# Patient Record
Sex: FEMALE | Race: WHITE
[De-identification: names, ages, dates, MRNs, and addresses within clinical notes are randomized per-mention and may not be internally consistent; named-entity substitution may affect disease eponyms.]

---

## 2021-12-01 ENCOUNTER — HOSPITAL ENCOUNTER (OUTPATIENT)
Age: 61
End: 2021-12-01
Payer: COMMERCIAL

## 2021-12-01 DIAGNOSIS — I10: Primary | ICD-10-CM

## 2021-12-01 DIAGNOSIS — R94.6: ICD-10-CM

## 2021-12-01 LAB
ANION GAP: 7 (ref 5–15)
BUN SERPL-MCNC: 17 MG/DL (ref 7–18)
BUN/CREAT RATIO: 21.8 RATIO (ref 10–20)
CALCIUM SERPL-MCNC: 8.9 MG/DL (ref 8.5–10.1)
CARBON DIOXIDE: 29 MMOL/L (ref 21–32)
CHLORIDE: 107 MMOL/L (ref 98–107)
CHOLEST SERPL-MCNC: 138 MG/DL
EST GLOM FILT RATE - AFR AMER: 96 ML/MIN (ref 60–?)
GLUCOSE: 89 MG/DL (ref 74–106)
POTASSIUM: 3.9 MMOL/L (ref 3.5–5.1)
TRIGLYCERIDES: 100 MG/DL
VLDLC SERPL-MCNC: 20 MG/DL (ref 5–40)

## 2021-12-01 PROCEDURE — 80061 LIPID PANEL: CPT

## 2021-12-01 PROCEDURE — 84443 ASSAY THYROID STIM HORMONE: CPT

## 2021-12-01 PROCEDURE — 36415 COLL VENOUS BLD VENIPUNCTURE: CPT

## 2021-12-01 PROCEDURE — 80048 BASIC METABOLIC PNL TOTAL CA: CPT

## 2021-12-01 PROCEDURE — 84439 ASSAY OF FREE THYROXINE: CPT

## 2022-08-04 ENCOUNTER — HOSPITAL ENCOUNTER (OUTPATIENT)
Age: 62
Discharge: HOME | End: 2022-08-04
Payer: COMMERCIAL

## 2022-08-04 DIAGNOSIS — M25.521: Primary | ICD-10-CM

## 2022-08-04 PROCEDURE — 73080 X-RAY EXAM OF ELBOW: CPT

## 2022-08-09 ENCOUNTER — HOSPITAL ENCOUNTER (OUTPATIENT)
Dept: HOSPITAL 100 - MTRAD | Age: 62
Discharge: HOME | End: 2022-08-09
Payer: COMMERCIAL

## 2022-08-09 DIAGNOSIS — M25.521: Primary | ICD-10-CM

## 2022-08-09 PROCEDURE — 73080 X-RAY EXAM OF ELBOW: CPT

## 2022-08-22 ENCOUNTER — HOSPITAL ENCOUNTER (OUTPATIENT)
Dept: HOSPITAL 100 - MTRAD | Age: 62
Discharge: HOME | End: 2022-08-22
Payer: COMMERCIAL

## 2022-08-22 DIAGNOSIS — M25.521: Primary | ICD-10-CM

## 2022-08-22 PROCEDURE — 73080 X-RAY EXAM OF ELBOW: CPT

## 2022-09-07 ENCOUNTER — HOSPITAL ENCOUNTER (OUTPATIENT)
Age: 62
Discharge: HOME | End: 2022-09-07
Payer: COMMERCIAL

## 2022-09-07 DIAGNOSIS — S52.121A: Primary | ICD-10-CM

## 2022-09-07 PROCEDURE — 73080 X-RAY EXAM OF ELBOW: CPT

## 2022-12-05 ENCOUNTER — HOSPITAL ENCOUNTER (OUTPATIENT)
Dept: HOSPITAL 100 - MFPLAB | Age: 62
Discharge: HOME | End: 2022-12-05
Payer: COMMERCIAL

## 2022-12-05 DIAGNOSIS — I10: Primary | ICD-10-CM

## 2022-12-05 LAB
ANION GAP: 5 (ref 5–15)
BUN SERPL-MCNC: 17 MG/DL (ref 7–18)
BUN/CREAT RATIO: 19.4 RATIO (ref 10–20)
CALCIUM SERPL-MCNC: 9.1 MG/DL (ref 8.5–10.1)
CARBON DIOXIDE: 29 MMOL/L (ref 21–32)
CHLORIDE: 108 MMOL/L (ref 98–107)
EST GLOM FILT RATE - AFR AMER: 84 ML/MIN (ref 60–?)
GLUCOSE: 101 MG/DL (ref 74–106)
POTASSIUM: 4.3 MMOL/L (ref 3.5–5.1)

## 2022-12-05 PROCEDURE — 80048 BASIC METABOLIC PNL TOTAL CA: CPT

## 2022-12-05 PROCEDURE — 36415 COLL VENOUS BLD VENIPUNCTURE: CPT

## 2023-08-04 ENCOUNTER — HOSPITAL ENCOUNTER (OUTPATIENT)
Dept: HOSPITAL 100 - LABSPEC | Age: 63
Discharge: HOME | End: 2023-08-04
Payer: COMMERCIAL

## 2023-08-04 DIAGNOSIS — R30.0: Primary | ICD-10-CM

## 2023-08-04 PROCEDURE — 87088 URINE BACTERIA CULTURE: CPT

## 2023-08-04 PROCEDURE — 87086 URINE CULTURE/COLONY COUNT: CPT

## 2024-08-08 ENCOUNTER — HOSPITAL ENCOUNTER (OUTPATIENT)
Dept: HOSPITAL 100 - MFPLAB | Age: 64
Discharge: HOME | End: 2024-08-08
Payer: MEDICARE

## 2024-08-08 DIAGNOSIS — R42: ICD-10-CM

## 2024-08-08 DIAGNOSIS — I10: Primary | ICD-10-CM

## 2024-08-08 LAB
ALANINE AMINOTRANSFER ALT/SGPT: 20 U/L (ref 13–56)
ALBUMIN SERPL-MCNC: 3.9 G/DL (ref 3.2–5)
ALKALINE PHOSPHATASE: 79 U/L (ref 45–117)
ANION GAP: 6 (ref 5–15)
AST(SGOT): 22 U/L (ref 15–37)
BUN SERPL-MCNC: 17 MG/DL (ref 7–18)
BUN/CREAT RATIO: 19.1 RATIO (ref 10–20)
CALCIUM SERPL-MCNC: 8.7 MG/DL (ref 8.5–10.1)
CARBON DIOXIDE: 26 MMOL/L (ref 21–32)
CHLORIDE: 109 MMOL/L (ref 98–107)
CHOLEST SERPL-MCNC: 155 MG/DL
DEPRECATED RDW RBC: 41.3 FL (ref 35.1–43.9)
ERYTHROCYTE [DISTWIDTH] IN BLOOD: 12.5 % (ref 11.6–14.6)
EST GLOM FILT RATE - AFR AMER: 81 ML/MIN (ref 60–?)
GLOBULIN: 3.9 G/DL (ref 2.2–4.2)
GLUCOSE: 85 MG/DL (ref 74–106)
HCT VFR BLD AUTO: 39.5 % (ref 37–47)
HEMOGLOBIN: 12.7 G/DL (ref 12–15)
HGB BLD-MCNC: 12.7 G/DL (ref 12–15)
MCV RBC: 89.8 FL (ref 81–99)
MEAN CORP HGB CONC: 32.2 G/DL (ref 32–36)
MEAN PLATELET VOL.: 11.5 FL (ref 6.2–12)
PLATELET # BLD: 276 K/MM3 (ref 150–450)
PLATELET COUNT: 276 K/MM3 (ref 150–450)
POTASSIUM: 4 MMOL/L (ref 3.5–5.1)
RBC # BLD AUTO: 4.4 M/MM3 (ref 4.2–5.4)
RBC DISTRIBUTION WIDTH CV: 12.5 % (ref 11.6–14.6)
RBC DISTRIBUTION WIDTH SD: 41.3 FL (ref 35.1–43.9)
TRIGLYCERIDES: 76 MG/DL
VLDLC SERPL-MCNC: 15 MG/DL (ref 5–40)
WBC # BLD AUTO: 5 K/MM3 (ref 4.4–11)
WHITE BLOOD COUNT: 5 K/MM3 (ref 4.4–11)

## 2024-08-08 PROCEDURE — 84443 ASSAY THYROID STIM HORMONE: CPT

## 2024-08-08 PROCEDURE — 36415 COLL VENOUS BLD VENIPUNCTURE: CPT

## 2024-08-08 PROCEDURE — 85027 COMPLETE CBC AUTOMATED: CPT

## 2024-08-08 PROCEDURE — 80061 LIPID PANEL: CPT

## 2024-08-08 PROCEDURE — 80053 COMPREHEN METABOLIC PANEL: CPT

## 2024-08-08 NOTE — XMS RPT_ITS
Comprehensive CCD (C-CDA v2.1)  
  
                           Created on: 2024  
  
  
MITRA ZAVALETA  
External Reference #: CDR,PersonID:[id number]  
: 1960  
Sex: Female  
  
Demographics  
  
  
                                        Address             [address]   
[place]  [address]  
   
                                        Home Phone          [phone]  
   
                                        Home Phone          [phone]  
   
                                        Home Phone          [phone]  
   
                                        Work Phone          [phone]  
   
                                        Preferred Language  en  
   
                                        Marital Status        
   
                                        Hoahaoism Affiliation Unknown  
   
                                        Race                Unknown  
   
                                        Ethnic Group        Not  or Lati  
no  
  
  
Author  
  
  
                                        Organization        [facility]  
  
  
Care Team Providers  
  
  
                                Care Team Member Name Role            Phone  
   
                                Jaquelin, Marc Attending       Unavailable  
   
                                Pease, Gil Vesna Primary Care    Unavailab  
le  
   
                                Estela Salazar Admitting       Unavailabl  
e  
   
                                Estela Salazar Attending       Unavailabl  
e  
   
                                No Doctor Assigned, Zara Primary Care    Unavail  
able  
   
                                Stencel, Marc Attending       Unavailable  
   
                                Stencel, Marc Primary Care    Unavailable  
   
                                Stencel, Marc Admitting       Unavailable  
   
                                Stencel, Marc Attending       Unavailable  
   
                                No Doctor Assigned, Zara [facility]    Unavail  
able  
   
                                Maynor Pruitt Admitting       Unavailable  
   
                                Furlan, Maynor VU Attending       Unavailable  
   
                                Stencel, Marc Primary Care    Unavailable  
   
                                Stencel, Marc Admitting       Unavailable  
   
                                Stencel, Marc Attending       Unavailable  
   
                                Stencel, Marc Primary Care    Unavailable  
   
                                Stencel, Marc Attending       Unavailable  
   
                                Stencel, Marc Primary Care    Unavailable  
   
                                Stencel, Marc Primary Care    Unavailable  
   
                                Stencel, Marc Admitting       Unavailable  
   
                                Stencel, Marc Attending       Unavailable  
   
                                Stencel, Marc Attending       Unavailable  
   
                                Stencel, Marc Primary Care    Unavailable  
   
                                FurlanMaynor Admitting       Unavailable  
   
                                Furlan, Maynor VU Attending       Unavailable  
   
                                Stencel, Marc Primary Care    Unavailable  
   
                                Stencel, Marc Primary Care    Unavailable  
   
                                JavierApril Admitting       Unavailable  
   
                                JavierApril Attending       Unavailable  
   
                                Stencel, Marc Attending       Unavailable  
   
                                Stencel, Marc Primary Care    Unavailable  
   
                                Stencel, Marc Attending       Unavailable  
   
                                Stencel, Marc Primary Care    Unavailable  
   
                                Stencel, Marc Attending       Unavailable  
   
                                Stencel, Marc Primary Care    Unavailable  
   
                                Stencel, Marc Admitting       Unavailable  
   
                                Stencel, Marc Attending       Unavailable  
   
                                Stencel, Marc Primary Care    Unavailable  
   
                                FurlanMaynor Admitting       Unavailable  
   
                                Furlan, Maynor Amador Attending       Unavailable  
   
                                Stencel, Marc CARL Referring       Unavailable  
   
                                Stencel, Marc CARL Primary Care    Unavailable  
   
                                Stencel, Marc CARL Primary Care    Unavailable  
   
                                Stencel, Marc CARL Primary Care    Unavailable  
   
                                Dwayne Seo MD Unavailable     [phone]  
[phone]  
   
                                Stencel, Marc Unavailable     Unavailable  
   
                                Stencel, Marc CARL Unavailable     Unavailable  
   
                                Furlan, Maynor J Unavailable     Unavailable  
   
                                Jaquelin NESS, Marc Newton Primary Care Provider   
[phone]  
   
                                MARC HAMMER Primary Care    UnavailMERNA Sanders Attending       Unavailable  
   
                                MERNA LIN Referring       Unavailable  
   
                                MARC HAMMER Primary Care    Unavailab  
GIL Kovacs Attending       Unavailable  
   
                                MERNA LIN Referring       Unavailable  
   
                                MARC HAMMER Primary Care    UnavailGIL Miller Attending       Unavailable  
   
                                MARC HAMMER Primary Care    Unavailab  
MERNA Coleman Attending       Unavailable  
  
  
  
Allergies  
  
  
                                                    Allergy   
Classification                          Reported   
Allergen(s)               Allergy Type              Date of   
Onset                     Reaction(s)               Facility  
   
                                                      
(2 sources)                             Codeine;   
Translations:   
[codeine]       Drug Allergy                                    [facility]   
Repository  
   
                                                      
(1 source)                              traMADol;   
Translations:   
[TraMADol   
Hydrochloride]  Drug Allergy                                    [facility]   
Repository  
   
                                                      
(1 source)                              No Known   
Medication   
Allergies;   
Translations: [No   
Known Medication   
Allergies]                              Propensity to   
adverse   
reactions to   
drug   
(disorder)                                                  [facility]   
Repository  
   
                                                      
(1 source)                              Latex;   
Translations:   
[LATEX]                                 allergy to   
substance                               20                                                  [facility]   
Clinic  
Work Phone:   
[phone]  
   
                                                      
(1 source)                              Mold Extract;   
Translations:   
[MOLD]                    Drug Allergy              20                                                  [facility]   
Clinic  
Work Phone:   
[phone]  
   
                                                      
(1 source)          Sulfacetamide       Drug Allergy        20                                                  [facility]   
Clinic  
Work Phone:   
[phone]  
   
                                                      
(1 source)                              PLANT POLLEN;   
Translations:   
[PLANT POLLEN]                          allergy to   
substance                               20                        hay fever                 [facility]   
Clinic  
Work Phone:   
[phone]  
   
                                                      
(1 source)                Adhesive Tape             Allergy to   
substance   
(finding)                                                   [facility]  
Work Phone:   
[phone]  
   
                                                      
(1 source)   Ampicillin   Drug Allergy                           [facility]   
[facility]  
Work Phone:   
[phone]  
   
                                                      
(1 source)   traMADol     Drug Allergy                           [facility]   
[facility]  
Work Phone:   
[phone]  
   
                                                      
(5 sources)                             Amoxicillin;   
Translations:   
[AMOXICILLIN]             Drug Allergy              20  
[address] [place] [address]  
   
                                                      
(5 sources)                             Sulfacetamide;   
Translations:   
[SULFACETAMIDE]           Drug Allergy              20  
20                        Diarrhea                  [facility]  
   
                                                      
(6 sources)                             tree nut,   
unspecified;   
Translations:   
[TREE NUT]                              Propensity to   
adverse   
reactions to   
drug                                    20  
20                        Hives                     [facility]  
   
                                                      
(5 sources)                             Adhesive   
Tape-Silicones;   
Translations:   
[ADHESIVE   
TAPE-SILICONES]                         Propensity to   
adverse   
reactions to   
drug                                    20  
23                        Dermatitis                [facility]  
   
                                                      
(3 sources)                             almond allergenic   
extract;   
Translations:   
[ALMOND]                  Drug Allergy              20  
24                                      Other (See   
Comments)                               [facility]  
  
  
  
Medications  
Current Medications  
  
  
  
                      Medication Drug Class(es) Dates      Sig (Normalized) Sig   
(Original)  
   
                                                    Ca carb-Ca gluc-Mg   
ox-Mg gluco (Calcium   
Magnesium) 500 mg   
calcium -250 mg Tab  
(4 sources)                                         Start:   
2010                              take 1 tablet by   
mouth once daily                        Ca carb-Ca gluc-Mg   
ox-Mg gluco   
(Calcium Magnesium)   
500 mg calcium -250   
mg Tab Take 1   
tablet by mouth   
daily . 0   
2010 Active  
   
                                                    lactobacillus combo   
no.11 (Probiotic) 15   
billion cell CpSP  
(4 sources)                                                     lactobacillus co  
mbo   
no.11 (Probiotic)   
15 billion cell   
CpSP Take by mouth   
. 0 Active  
   
                                                    losartan potassium   
50 mg oral tablet  
(6 sources)                             Angiotensin 2   
Receptor Blocker                        Start:   
2019                              take 1 tablet by   
mouth once daily                        losartan (COZAAR)   
50 MG tablet Take 1   
(one) tablet (50 mg   
total) by mouth   
daily . 0   
2019 Active  
   
                                                    multivitamin   
(THERAGRAN) per   
tablet  
(4 sources)                                         Start:   
2010                              take 1 tablet by   
mouth once daily                        multivitamin   
(THERAGRAN) per   
tablet Take 1 (one)   
tablet by mouth   
daily . 0   
2010 Active  
   
                                                    VITAMIN B COMPLEX   
ORAL  
(4 sources)                                         Start:   
2010                              take 1 tablet by   
mouth once daily                        VITAMIN B COMPLEX   
ORAL Take 1 tablet   
by mouth daily . 0   
2010 Active  
  
  
  
Completed/Discontinued Medications  
  
  
  
                      Medication Drug Class(es) Dates      Sig (Normalized) Sig   
(Original)  
   
                                                    acetaminophen 325 mg   
oral tablet  
(1 source)                                                  take 1 tablet by   
mouth three times   
daily as needed                         Tylenol 325 MG   
Oral Tablet TAKE 1   
TABLET 3 times   
daily PRN Refills:   
0 Active  
   
                                                    aspirin 81 mg oral   
tablet  
(1 source)                              Platelet Aggregation   
Inhibitor,   
Nonsteroidal   
Anti-inflammatory   
Drug                                                        Aspirin 81 MG TABS   
TAKE 1 TABLET   
DAILY. Refills: 0   
Active  
  
  
  
Problems  
Active Problems  
  
  
                      Problem Classification Problem    Date       Documented Da  
te Episodic/Chronic  
   
                                                    Abdominal pain  
(1 source)                              Abdominal pain;   
Translations:   
[Abdominal pain,   
unspecified   
abdominal location]                                         Episodic  
   
                                                    Acute cerebrovascular   
disease  
(2 sources)                             Cerebral artery   
occlusion;   
Translations:   
[Lacunar infarction]                                         Chronic  
   
                                                    Conditions associated   
with dizziness or   
vertigo  
(1 source)                              Meniere's disease of   
right inner ear;   
Translations:   
[Meniere's disease,   
right ear]                              2024          Chronic  
   
                                                    Essential hypertension  
(1 source)                              Hypertensive   
disorder;   
Translations:   
[Hypertension]                                              Chronic  
   
                                                    Other connective   
tissue disease  
(1 source)                              Trigger thumb, left   
thumb; Translations:   
[Trigger thumb, left   
thumb]                                  Onset:   
2020                Episodic  
   
                                                    Other connective   
tissue disease  
(1 source)                              Trigger thumb, right   
thumb; Translations:   
[Trigger thumb,   
right thumb]                            Onset:   
2020                Episodic  
   
                                                    Other ear and sense   
organ disorders  
(5 sources)                             Sensorineural   
hearing loss,   
bilateral;   
Translations:   
[Sensorineural   
hearing loss,   
bilateral]                              Onset:   
2024                Chronic  
   
                                                    Other ear and sense   
organ disorders  
(1 source)                              Sensorineural   
hearing loss,   
bilateral;   
Translations:   
[Sensorineural   
hearing loss,   
bilateral]                              Onset:   
2024                                          Chronic  
   
                                                    Other ear and sense   
organ disorders  
(1 source)                              Abnormal auditory   
perception;   
Translations: [Other   
abnormal auditory   
perceptions, right   
ear]                                    2023          Episodic  
   
                                                    Other lower   
respiratory disease  
(1 source)                              Cough; Translations:   
[Cough]                                                     Episodic  
  
  
Past or Other Problems  
  
  
                      Problem Classification Problem    Date       Documented Da  
te Episodic/Chronic  
   
                                                    Conditions associated   
with dizziness or   
vertigo  
(4 sources)                             Vertigo;   
Translations:   
[Dizziness and   
giddiness]                              Onset:   
2023                Episodic  
   
                                                    Other ear and sense   
organ disorders  
(2 sources)                             Other abnormal   
auditory   
perceptions,   
right ear;   
Translations:   
[Other abnormal   
auditory   
perceptions,   
right ear]                              Onset:   
2023                                          Episodic  
   
                                                    Unclassified  
(1 source)      Problem                                           
   
                                                    Unclassified  
(2 sources)                             Patient encounter   
status;   
Translations:   
[Screening for   
colon cancer]                                                 
   
                                                    NEGATED: Highlighted   
row has not   
occurred!Residual   
codes; unclassified  
(3 sources)     Disease                                         Episodic  
  
  
  
Results  
  
  
                          Test Name    Value        Interpretation Reference   
[facility]                                   Facility  
   
                                                    DIGITAL MAMM SCREENING W/ TO  
[facility] 2021   
   
                                                    DIGITAL MAMM   
SCREENING W/ KIMBER[facility]                       MRN: [id number]  
Patient Name:   
BALDOTUNDE, MITRA  
  
STUDY:  
DIGITAL MAMM   
SCREENING W/ KIMBERLY;   
2021 9:53 am  
  
ACCESSION NUMBER(S):  
[id number]  
  
ORDERING CLINICIAN:  
APRIL FRIAS  
  
INDICATION:  
Screening.  
  
COMPARISON:  
2016 and   
2020  
  
FINDINGS:  
2D and tomosynthesis   
images were reviewed   
at 1 mm slice   
thickness.  
  
There are areas of   
scattered   
fibroglandular   
tissue. No   
suspicious  
masses or   
calcifications are   
identified. CAD was   
utilized.  
  
IMPRESSION:  
No mammographic   
evidence of   
malignancy.  
  
BI-RADS CATEGORY:  
  
Category: 1 -   
Negative.  
Recommendation: 1   
Year Screening.  
  
For any future   
breast imaging   
appointments, please   
call [phone]  
([phone]).  
  
  
  
Electronically   
signed by: JESUS COHEN MD  [facility]  
   
                                                    Office Visit (Primary Care T  
xt/Forms)on 2021   
   
                                        Follow-up visit     Diagnoses/Problems  
Assessed  
Hypertension (401.9)   
(I10)  
Adult hypothyroidism   
(244.9) (E03.9)  
Orders  
Adult hypothyroidism  
TSH - Thyroid   
Stimulating Hormone,   
Serum;   
Status:Active;   
Requested   
for:2021;  
Chief Complaint  
6 MO FU  
  
History of Present   
Illness  
layed off from   
[facility] as no   
activity  
HTN-Takes and   
tolerates meds   
without side   
effects. No alcohol.   
no tobacco. no   
exercise. low salt.   
Reviewed   
recommendation for   
150 minutes of   
exercise per week   
including 2 days of   
weight training if   
over age 50. home   
readings at goal.   
<130/<80  
would exercise at   
wellness  
compensated   
hypothyroidism rba   
rev and ros neg  
celiac test negative  
  
Review of Systems  
General-no fatigue   
weight to within 10   
pounds  
ENT no problems with   
vision swallowing  
Cardiac no chest   
pains palpitations   
change in exercise   
tolerance or   
capacity  
Pulmonary no cough   
shortness of breath  
GI no heartburn or   
abdominal pain  
Musculoskeletal ++   
joint pain in   
operated knee  
  
Active Problems  
Problems  
Abdominal pain,   
unspecified   
abdominal location   
(789.00) (R10.9)  
Cough (786.2) (R05)  
Hypertension (401.9)   
(I10)  
Lacunar infarction   
(434.91) (I63.81)  
Screening for breast   
cancer (V76.10)   
(Z12.39)  
Screening for colon   
cancer (V76.51)   
(Z12.11)  
Subcortical   
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   
(I63.9)  
Surgical History  
Problems  
History of   
Hysterectomy  
History of Knee   
Surgery  
Family History  
Mother  
Family history of   
diabetes mellitus   
(V18.0) (Z83.3)  
Family history of   
hypertension   
(V17.49) (Z82.49)  
Family history of   
renal failure   
(V18.69) (Z84.1)  
Father  
Family history of   
malignant neoplasm   
of colon (V16.0)   
(Z80.0)  
Social History  
Problems  
Denies alcohol   
consumption (V49.89)   
(Z78.9)  
Never smoked tobacco   
(V49.89) (Z78.9)  
No advance   
directives (V49.89)   
(Z78.9)  
No alcohol use  
Current Meds  
  
Medication   
NameInstruction  
Aspirin 81 MG   
TABSTAKE 1 TABLET   
DAILY.  
Losartan Potassium   
50 MG Oral   
TabletTAKE 1 TABLET   
DAILY.  
Tylenol 325 MG Oral   
TabletTAKE 1 TABLET   
3 times daily PRN  
Allergies  
Medication  
ampicillin  
codeine  
tramadol  
NonMedication  
Adhesive Tape  
Vitals  
Vital Signs  
Recorded: 2021   
08:20AM  
Temperature: 96.9 F  
Heart Rate: 61  
Systolic: 128  
Diastolic: 76  
Height: 5 ft 4 in  
Weight: 221 lb 6 oz  
BMI Calculated: 38  
BSA Calculated: 2.04  
Tobacco Use: b) No  
Fall Screening: a)   
No falls within the   
last year  
O2 Saturation: 97  
Physical Exam  
General: Alert, No   
acute distress.   
Appears stated age  
Eye: Pupils are   
equal, round and   
reactive to light,   
Extraocular   
movements are   
intact, Normal   
conjunctiva.  
Neck: Supple,   
Non-tender, No   
carotid bruit, No   
jugular venous   
distention, No   
lymphadenopathy, No   
thyromegaly.  
Respiratory: Lungs   
are clear to   
auscultation,   
Respirations are   
non-labored, Breath   
sounds are equal.  
Cardiovascular:   
Normal rate, Regular   
rhythm, No murmur.  
Gastrointestinal:   
Soft, Non-tender, No   
organomegaly. No   
solid or pulsatile   
mass  
Integumentary: Warm,   
Dry. No concerning   
lesions on exposed   
areas  
Neurologic: Alert,   
Oriented. Gross and   
fine motor intact,   
CN 2-12 intact  
Psychiatric:   
Cooperative,   
Appropriate mood AND   
affect.  
  
Results/Data  
tsh 5.1  
  
Signatures  
Electronically   
signed by : Marc Hammer MD; 2021 8:56AM EST   
(Author)  
Electronically   
signed by : Marc Hammer MD; 2021 12:36PM EST   
(Author)            Normal                                   [facility]  
   
                                                    Clinical Summary: HMSPatient  
IDon 2020   
   
                      account number [id number] [facility]   
[facility]  
Work Phone:   
[phone]  
   
                                                    Office Visit: New/Est - 1st   
visit with physician, Rm: 3on 2020   
   
                                                    NEGATED: Highlighted   
rowxray history                         of the right hand on   
2019 at   
[facility]  
Work Phone:   
[phone]  
   
                                                    Clinical Lists Update: Prelo  
ad Extendedon 2020   
   
                                                    Tobacco smoking   
status NHIS                             Tobacco smoking   
status NHIS                                                 [facility]  
Work Phone:   
[phone]  
   
                                                    Clinical Summary: Data Submi  
tted by Patient in [place] 2020   
   
                      #DEP CHLDRN No                               [facility]  
c   
[facility]  
Work Phone:   
[phone]  
   
                      ASTHEHSZHOUS 2 floors                         [facility]  
Work Phone:   
[phone]  
   
                      BROTHERS PMH High blood pressure                       [place]  
[facility]  
Work Phone:   
[phone]  
   
                      DEATHCAU DAD Colon Cancer                       [facility]  
Work Phone:   
[phone]  
   
                                        DEP ALG LIST        Sulfa Drugs,I don't   
have any food   
allergies.,Latex,Mol  
d,Plant pollens (Hay   
Fever)                                                      [facility]  
Work Phone:   
[phone]  
   
                      DEP DAD PMH Cancer                           [facility]  
Work Phone:   
[phone]  
   
                      DEP DRUG USE No                               [facility]  
Work Phone:   
[phone]  
   
                      DEP EMPLOYER employed                         [facility]  
Work Phone:   
[phone]  
   
                      DEP ETOH USE No                               [facility]  
Work Phone:   
[phone]  
   
                      DEP EXERCISE No                               [facility]  
Work Phone:   
[phone]  
   
                                        DEP MED LIST        Losartan 50 mg Tab,   
1 times per day                                             [facility]  
Work Phone:   
[phone]  
   
                                        DEP MOM PMH         Arthritis, Diabetes   
- insulin dependent,   
High blood pressure,   
Kidney disease,   
Obesity                                                     [facility]  
Work Phone:   
[phone]  
   
                      DEP SH CSMO never smoker                       [facility]  
birgit   
[facility]   
[facility]  
Work Phone:   
[phone]  
   
                      DEP SH MAST                           [facility]  
c   
[facility]   
[facility]  
Work Phone:   
[phone]  
   
                      DEP SH OCCUP                        [facility]  
[facility]   
[facility]  
Work Phone:   
[phone]  
   
                      DEP SURGERY Hysterectomy                       [facility]  
birgit   
[facility]   
[facility]  
Work Phone:   
[phone]  
   
                      DEPADDLPROB High Blood Pressure                       [place]  
[facility]   
[facility]  
Work Phone:   
[phone]  
   
                      FATHER A/D                          [facility]   
[facility]  
Work Phone:   
[phone]  
   
                      MOM HX COMM CHF                              [facility]  
c   
[facility]   
[facility]  
Work Phone:   
[phone]  
   
                      MOTHER A/D Alive                            [facility]   
[facility]  
Work Phone:   
[phone]  
   
                      RLATNSHPINFR Self                             [facility]  
ic   
[facility]   
[facility]  
Work Phone:   
[phone]  
   
                      SWHOUTYPE  house                            [facility]   
[facility]  
Work Phone:   
[phone]  
   
                                        WEBSURGCOM          Knee Surgery -   
Tissue/Bone                                                 [facility]   
[facility]  
Work Phone:   
[phone]  
   
                                                    ERIKOVon 2019   
   
                                        CNOV                Office Visit   
(WILL)  
--------------------  
MITRA ZAVALETA   
([id number])   
1960 F  
Date Time Provider   
Department  
19 1:00 PM   
EBEN BLANCO  
During your visit   
today, we recorded   
the following   
information about   
you:  
Eben Blanco MD   
2019 1:22 PM   
Signed  
New patient referred   
by Herelf for   
bilateral hand   
concerns. We will   
correspond  
with via a shared   
medical record and a   
copy of this office   
note.  
HPI: Ms.. Zavaleta is   
a R hand dominant 59   
year old year old   
female who is  
active, with a chief   
complaint of   
bilateral thumb   
trigger finger. The   
symptoms  
have been going on   
for 2 months on the   
right, 1 year on the   
left. No antecedent  
trauma, the patient   
is not diabetic.   
Other complaints   
include pain when   
this  
triggers. Evaluation   
has included x-rays   
of the hand.   
Treatment to date   
has  
included splints.   
The current pain is   
rated a 4/10 and   
interferes with  
activities of daily   
living. She states   
her brother has the   
same thing and gets  
injections for them.  
PAST MEDICAL HISTORY  
Diagnosis Date  
- Overweight  
Current Outpatient   
Medications  
Medication Sig   
Dispense Refill  
- losartan (COZAAR)   
50 mg tablet  
- aspirin, enteric   
coated (ASPIRIN,   
ENTERIC COATED) 81   
mg EC tablet Take 81   
mg  
by mouth once daily.  
- estradiol(ESTROGEL   
1.25 GRAM/ACTUATION   
(0.06%) TRANSDERMAL   
GEL PUMP) 0  
- hydrocodone   
bit/acetaminophen(VI  
CODIN 5 MG-500 MG   
TAB) Take 1-2   
tablet's)  
every six(6) hours   
as needed for pain.   
0  
- vitamin b   
complex(B COMPLEX   
CAP) Take one(1)   
tablet daily. 0  
- MULTIVITAMIN TAB   
Take one(1) tablet   
daily. 0  
- calcium carb/vit   
d3/minerals(CALTRATE   
PLUS 600 MG-400 UNIT   
TAB) Take one(1)  
tablet daily. 0  
- CALCIUM-MAGNESIUM   
TAB Take one(1)   
tablet daily. 0  
No current   
facility-administere  
d medications for   
this visit.  
ALLERGIES  
Allergen Reactions  
- Adhesive  
Blistering from   
tape, suspects latex   
allergy, not   
confirmed.  
- Hayfever [Other]  
- Versed [Midazolam   
H*  
Migraine, GI upset,   
nausea, vomiting  
All medical history,   
medications and   
allergies have been   
discussed with the  
patient today.  
ROS:  
REVIEW OF SYMPTOMS:  
Constitutional:   
patient denies any   
recent fever or   
significant change   
in weight  
Gastrointestinal:   
patient denies any   
current abdominal   
discomfort  
Musculoskeletal: as   
noted in the HPI  
Neurologic: as noted   
in the HPI  
SOCIAL HISTORY:  
Tobacco Use: Never  
Physical   
Examination: Ms.. Zavaleta is a healthy   
appearing female in   
no acute  
distress. Bilateral   
upper limbs have   
equal and intact   
peripheral pulses.   
Skin  
does not demonstrate   
any rashes or   
lesions. Negative   
Tinel's over the  
bilateral carpal   
tunnel. Sensation is   
intact throughout.   
Positive tenderness  
to palpation over   
the thumb A1 pulleys   
and some clicking of   
the involved  
digits, no locking,   
at this time but   
cannot flex the R   
thumb IP joint. The  
extensor tendons all   
track centrally.  
X-rays dated   
2019:   
No fracture or   
dislocation  
Assessment: Trigger   
finger of both hands   
(primary encounter   
diagnosis)  
Pain of right hand  
Plan: I discussed   
with Ms.. Zavaleta   
the diagnosis and   
different treatment  
options. We   
discussed   
observation,   
splinting, cortisone   
injections and   
surgical  
release. The patient   
would like to try   
cortisone   
injections. RTC 8   
weeks prn.  
The risk, benefits   
and alternatives of   
injection and no   
injection therapy   
were  
discussed. The   
patient consented   
for an injection.   
The patient has been  
identified by name   
and birthdate. The   
injection site was   
identified, marked   
and  
prepped with a   
alcohol swab. Time   
out completed at   
12:53 PM. The   
bilateral  
thumbs was/were   
injected with a 25   
gauge needle with   
1/2cc Celestone and   
1/2 cc  
xylocaine plain 1%.   
The injection site   
was then dressed   
with a bandaid. The  
patient tolerated   
the injection well.   
The patient was   
instructed to   
monitor  
their blood sugars   
if diabetic and call   
if any concerns. The   
patient was  
instructed to call   
the office if any   
adverse local   
effects occurred or   
any if  
any questions or   
concerns arise.  
Vero Porter MD  
2019   
12:49 PM  
Attending Note:  
I have seen and   
examined Ms.. Zavaleta and   
discussed the   
patient management   
with  
the resident/fellow.   
The   
resident's/fellow's   
progress note has   
been reviewed,  
edited, and signed.   
I was present for   
any and all   
procedures performed   
at this  
visit.  
Eben Blanco MD  
2019   
1:20 PM  
Referring Provider:   
SELF [[id number]]  
Allergies As of   
Date: 2019   
Noted Allergy   
Reaction  
ADHESIVE 2010  
Comments: Blistering   
from tape, suspects   
latex allergy, not   
confirmed.  
hayfever [Other]   
2010  
VERSED (MIDAZOLAM   
HCL) 2010  
Comments: Migraine,   
GI upset, nausea,   
vomiting  
Date Reviewed:   
2019  
Reviewed by: Eben Blanco - Fully   
Assessed  
Reason for Visit:  
New Patient [172]   
Cmt: right thumb  
Primary Visit   
Diagnosis:Trigger   
finger of both hands   
[M65.30]  
Other Visit   
Diagnosis:Pain of   
right hand [M79.641]  
Order(s):XR HAND   
GENERAL 3V   
PA/LAT/OBL RT   
[[id number]] Order #:   
[id number] FUTURE  
betamethasone   
acetate-betamethason  
e sodium phosphate 3   
mg, lidocaine  
(PF) 10 mg/mL (1 %)   
5 mgDisp: Rfl:  
betamethasone   
acetate-betamethason  
e sodium phosphate 3   
mg, lidocaine  
(PF) 10 mg/mL (1 %)   
5 mgDisp: Rfl:  
Prescriptions as of   
2019 Sig:  
LOSARTAN 50 MG   
TABLET  
ASPIRIN 81 MG   
TABLET,DELAYED *   
Take 81 mg by mouth   
once jocelynn*  
ESTROGEL 1.25   
GRAM/ACTUATION *  
VICODIN 5 MG-500 MG   
TABLET Take 1-2   
tablet's) every   
six(*  
B COMPLEX CAPSULE   
Take one(1) tablet   
daily.  
MULTIVITAMIN TABLET   
Take one(1) tablet   
daily.  
CALTRATE PLUS 600 MG   
CALCIUM-* Take   
one(1) tablet daily.  
CALCIUM-MAGNESIUM   
TABLET Take one(1)   
tablet daily.  
Problem List As Of   
Date 2019   
Noted Resolved  
Osteoarth NOS-L/Leg   
[M17.10] INVALID   
FOR*  
Prescriptions   
ordered this   
encounter Disp   
Refills Start End  
CAM IVETTE INJECTION   
BUILDER 2019  
Class: Suppress   
Questions  
Route: [facility]  
CAM IVETTE INJECTION   
BUILDER 2019  
Class: Suppress   
Questions  
Route: [facility]  
Encounter Number:   
[id number]  
Encounter   
Status:Closed by   
EBEN BLANCO MD   
on 19          [facility]  
   
                                                    PROGRESSon 2019   
   
                                        PROGRESS            HNO ID: [id number]  
Author: Eben Blanco  
Service: ?  
Author Type:   
Physician  
Type: Progress Notes  
Filed: 2019   
1:22 PM  
Note Text:  
New patient referred   
by Herelf for   
bilateral hand   
concerns. We will  
correspond with via   
a shared medical   
record and a copy of   
this office  
note.  
HPI: Ms.. Zavaleta is   
a R hand dominant 59   
year old year old   
female who is  
active, with a chief   
complaint of   
bilateral thumb   
trigger finger. The  
symptoms have been   
going on for 2   
months on the right,   
1 year on the left.  
No antecedent   
trauma, the patient   
is not diabetic.   
Other complaints  
include pain when   
this triggers.   
Evaluation has   
included x-rays of   
the  
hand. Treatment to   
date has included   
splints. The current   
pain is rated a  
4/10 and interferes   
with activities of   
daily living. She   
states her  
brother has the same   
thing and gets   
injections for them.  
PAST MEDICAL HISTORY  
Diagnosis Date  
- Overweight  
Current Outpatient   
Medications  
Medication Sig   
Dispense Refill  
- losartan (COZAAR)   
50 mg tablet  
- aspirin, enteric   
coated (ASPIRIN,   
ENTERIC COATED) 81   
mg EC tablet Take  
81 mg by mouth once   
daily.  
- estradiol(ESTROGEL   
1.25 GRAM/ACTUATION   
(0.06%) TRANSDERMAL   
GEL PUMP) 0  
- hydrocodone   
bit/acetaminophen(VI  
CODIN 5 MG-500 MG   
TAB) Take 1-2  
tablet's) every   
six(6) hours as   
needed for pain. 0  
- vitamin b   
complex(B COMPLEX   
CAP) Take one(1)   
tablet daily. 0  
- MULTIVITAMIN TAB   
Take one(1) tablet   
daily. 0  
- calcium carb/vit   
d3/minerals(CALTRATE   
PLUS 600 MG-400 UNIT   
TAB) Take  
one(1) tablet daily.   
0  
- CALCIUM-MAGNESIUM   
TAB Take one(1)   
tablet daily. 0  
No current   
facility-administere  
d medications for   
this visit.  
ALLERGIES  
Allergen Reactions  
- Adhesive  
Blistering from   
tape, suspects latex   
allergy, not   
confirmed.  
- Hayfever [Other]  
- Versed [Midazolam   
H*  
Migraine, GI upset,   
nausea, vomiting  
All medical history,   
medications and   
allergies have been   
discussed with  
the patient today.  
ROS:  
REVIEW OF SYMPTOMS:  
Constitutional:   
patient denies any   
recent fever or   
significant change   
in  
weight  
Gastrointestinal:   
patient denies any   
current abdominal   
discomfort  
Musculoskeletal: as   
noted in the HPI  
Neurologic: as noted   
in the HPI  
SOCIAL HISTORY:  
Tobacco Use: Never  
Physical   
Examination: Ms.. Zavaleta is a healthy   
appearing female in   
no  
acute distress.   
Bilateral upper   
limbs have equal and   
intact peripheral  
pulses. Skin does   
not demonstrate any   
rashes or lesions.   
Negative  
Tinel's over the   
bilateral carpal   
tunnel. Sensation is   
intact throughout.  
Positive tenderness   
to palpation over   
the thumb A1 pulleys   
and some  
clicking of the   
involved digits, no   
locking, at this   
time but cannot flex  
the R thumb IP   
joint. The extensor   
tendons all track   
centrally.  
X-rays dated   
2019:   
No fracture or   
dislocation  
Assessment: Trigger   
finger of both hands   
(primary encounter   
diagnosis)  
Pain of right hand  
Plan: I discussed   
with Ms.. Zavaleta   
the diagnosis and   
different treatment  
options. We   
discussed   
observation,   
splinting, cortisone   
injections and  
surgical release.   
The patient would   
like to try   
cortisone   
injections. RTC  
8 weeks prn.  
The risk, benefits   
and alternatives of   
injection and no   
injection therapy  
were discussed. The   
patient consented   
for an injection.   
The patient has  
been identified by   
name and birthdate.   
The injection site   
was identified,  
marked and prepped   
with a alcohol swab.   
Time out completed   
at 12:53 PM.  
The bilateral thumbs   
was/were injected   
with a 25 gauge   
needle with 1/2cc  
Celestone and 1/2 cc   
xylocaine plain 1%.   
The injection site   
was then  
dressed with a   
bandaid. The patient   
tolerated the   
injection well. The  
patient was   
instructed to   
monitor their blood   
sugars if diabetic   
and call  
if any concerns. The   
patient was   
instructed to call   
the office if any  
adverse local   
effects occurred or   
any if any questions   
or concerns arise.  
Vero Porter MD  
2019   
12:49 PM  
Attending Note:  
I have seen and   
examined Ms.. Zavaleta and   
discussed the   
patient management  
with the   
resident/fellow. The   
resident's/fellow's   
progress note has   
been  
reviewed, edited,   
and signed. I was   
present for any and   
all procedures  
performed at this   
visit.  
Eben Blanco MD  
2019   
1:20 PM             Normal                                  [facility]  
   
                                        PROGRESS            HNO ID: [id number]  
Author: Denis Vines  
Service: ?  
Author Type:   
Technician  
Type: Progress Notes  
Filed: 2019   
12:29 PM  
Note Text:  
Radiology Service   
Progress Note  
PATIENT NAME: Mitra Zavaleta  
MRN: [id number]  
DATE OF SERVICE:   
2019  
TIME: 12:28 PM  
PATIENT IDENTITY   
VERIFICATION   
COMPLETED USING TWO   
(2) METHODS: Name   
and  
Date of Birth   
confirmed by patient   
verbally.  
PATIENT GENDER DATA:   
Female. Pregnancy   
status: Pregnant: No  
Breastfeeding   
status: NO.  
PATIENT RELEVANT   
IMPLANT DATA   
REVIEWED: Not   
Applicable  
RADIOLOGY   
DEPARTMENT: General   
X-ray: Exam(s)   
Completed: Upper   
Extremity  
X-Ray(s): Hand,   
right :  
PERIPHERAL IV DATA:   
Not applicable  
SIGNED BY: Denis Vines  
2019   
12:28 PM            [facility]  
   
                                                    XR HAND 3V PA/LAT/OBL RTon 0  
2019   
   
                                                    XR HAND 3V   
PA/LAT/OBL RT                           * * *Final Report* *   
*  
DATE OF EXAM: Sep 30   
2019 12:30PM  
[id number] - XR HAND   
3V PA/LAT/OBL RT /   
ACCESSION #   
[id number]  
PROCEDURE REASON:   
Pain of right hand  
  
* * * * Physician   
Interpretation * * *   
*  
HISTORY: Pain of   
right hand . Rt   
thumb pain  
TECHNIQUE: XR HAND   
3V PA/LAT/OBL RT  
Laterality: RIGHT  
Number of different   
views (projections):   
3  
COMPARISON: None  
RESULT:  
No fracture or   
dislocation. Thumb   
CMC joint is   
maintained.   
Multilevel  
interphalangeal   
joint space   
narrowing with   
sclerosis.   
Osteophytes, most  
significant at   
second digit DIP.  
--------------------  
--------------------  
-----  
IMPRESSION:  
Degenerative   
changes, without   
acute process.  
:   
JACOB  
Transcribe   
Date/Time: Sep 30   
2019 3:19P  
Dictated by :   
TALISHA FERRARI MD  
This examination was   
interpreted and the   
report reviewed and  
electronically   
signed by:  
BRYANNA CERRATO MD on   
Sep 30 2019 8:48PM   
EST  
[id number]  
CN                  Normal                                  [facility]  
   
                                                    XR Wrist 3+ Views Lefton    
   
                                                    XR Wrist 3+ Views   
Left                                    Exam Date/Time:  
2019 08:10 EST  
Reason for Exam:  
Pain, Traumatic  
Report  
STUDY:  
XR Wrist 3+ Views   
Left; 2019 8:10   
am  
INDICATION:  
Pain, Traumatic.  
COMPARISON:  
None.  
ACCESSION NUMBER(S):  
[id number]  
ORDERING CLINICIAN:  
Marc Hammer  
TECHNIQUE:  
Four views of the   
left wrist including   
AP, oblique,   
navicular cone down   
and lateral   
projections  
were obtained.  
FINDINGS:  
There is no evidence   
of acute fracture or   
dislocation   
identified. The   
joint spaces are   
well  
preserved without   
significant   
degenerative   
changes.  
IMPRESSION:  
1. No evidence of   
acute fracture or   
dislocation.  
***** FINAL REPORT   
*****  
Dictated: 2019   
10:10 am Kennedy Perez MD  
Signed (Electronic   
Signature):   
2019 10:10 am  
Signed by: Kennedy Perez MD  
Technologist: [facility]  
  
  
  
Vital Signs  
  
  
                          Date Time    Vital Sign   Value        Performing   
Clinician                               Facility  
   
                                                    2024   
10:          Body height         160 cm              Merna Lin MD  
Work Phone:   
[phone]                          [facility]  
   
                                                    2024   
10:                              Body mass index   
(BMI) [Ratio]             37.7 kg/m2                Merna Lin MD  
Work Phone:   
[phone]                          [facility]  
   
                                                    2024   
10:          Body temperature    97.9 [degF]         Merna Lin MD  
Work Phone:   
[phone]                          [facility]  
   
                                                    2024   
10:          Body weight         96.53 kg            Merna Lin MD  
Work Phone:   
[phone]                          [facility]  
   
                                                    2023   
10:          Body height         160 cm              Merna Lin MD  
Work Phone:   
[phone]                          [facility]  
   
                                                    2023   
10:                              Body mass index   
(BMI) [Ratio]             37.2 kg/m2                Merna Lin MD  
Work Phone:   
[phone]                          [facility]  
   
                                                    2023   
10:          Body temperature    98.2 [degF]         Merna Lin MD  
Work Phone:   
[phone]                          [facility]  
   
                                                    2023   
10:          Body weight         95.25 kg            Merna Lin MD  
Work Phone:   
[phone]                          [facility]  
   
                                                    2020   
17:                              BMI (Body Mass   
Index)              37.25 kg/m2         Marc Hammer     [facility]  
Work Phone:   
[phone]  
   
                                                    2020   
17:      Body weight     98.43 kg        Marc Hammer [facility]  
Work Phone:   
[phone]  
   
                                                    2020   
17:      BP Diastolic    78 mm[Hg]       Marc Hammer [facility]  
Work Phone:   
[phone]  
   
                                                    2020   
17:      BP Systolic     125 mm[Hg]      Marc Hammer [facility]  
Work Phone:   
[phone]  
   
                                                    2020   
17:                              BSA (Body Surface   
Area)               2.03 m2             Marc Hammer     [facility]  
Work Phone:   
[phone]  
   
                                                    2020   
17:      Height          162.56 cm       Marc Hammer [facility]  
Work Phone:   
[phone]  
   
                                                    NEGATED:   
Highlighted   
   
12:                              BMI (Body Mass   
Index)              37.9 kg/m2          Sandhya Morales AT     [facility]  
Work Phone:   
[phone]  
   
                                                    NEGATED:   
Highlighted   
   
12:      Body weight     99.79 kg        Sandhya Andrew AT [facility]   
Clinic  
Work Phone:   
[phone]  
   
                                                    NEGATED:   
Highlighted   
   
12:      Body weight     100 kg          Sandhya Andrew AT [facility]  
Work Phone:   
[phone]  
   
                                                    NEGATED:   
Highlighted   
   
12:      BP Diastolic    85 mm[Hg]       Sandhya Andrew AT [facility]  
Work Phone:   
[phone]  
   
                                                    NEGATED:   
Highlighted   
   
12:      BP Systolic     131 mm[Hg]      Sandhya Andrew AT [facility]  
Work Phone:   
[phone]  
   
                                                    NEGATED:   
Highlighted   
   
12:      Height          162.56 cm       Sandhya Andrew AT [facility]  
Work Phone:   
[phone]  
   
                                                    NEGATED:   
Highlighted   
   
12:      Height          163 cm          Sandhya Andrew AT [facility]  
Work Phone:   
[phone]  
   
                                                    NEGATED:   
Highlighted   
   
12:      Pulse (Heart Rate) 81 /min         Sandhya Garciaid AT [facility]  
Work Phone:   
[phone]  
  
  
  
Encounters  
  
  
                          Encounter Date Encounter Type Care Provider Facility  
   
                                                    Start: 2024  
End: 2024     ambulatory          MERNA LIN [facility]  
ry  
   
                                                    Start: 2024  
End: 2024           ambulatory                MARC HAMMER                                 [facility]  
   
                                                    Start: 2024  
End: 2024           Clinical Support          Gil Rouse  
Work Phone:   
[phone]                          [facility]  
   
                                        Comment on above:   Sensorineural hearin  
g loss, bilateral   
   
                                                    Start: 2024  
End: 2024                         Office outpatient visit   
15 minutes                              Merna Lin MD  
Work Phone:   
[phone]                          [facility] [place]  
   
                                        Comment on above:   Meniere disease, rig  
ht (Primary Dx);  
Sensorineural hearing loss, bilateral;  
Vertigo   
   
                                                    Start: 2023  
End: 2023     ambulatory          MERNA LIN [facility]  
ry  
   
                                                    Start: 2023  
End: 2023           Clinical Support          Gil Rouse  
Work Phone:   
[phone]                          [facility]  
   
                                        Comment on above:   Sensorineural hearin  
g loss, bilateral (Primary Dx)   
   
                                                    Start: 2023  
End: 2023                         Office outpatient new 30   
minutes                                 Merna Lin MD  
Work Phone:   
[phone]                          [facility]  
   
                                        Comment on above:   Vertigo (Primary Dx)  
;  
Sensorineural hearing loss, bilateral;  
Abnormal auditory perception of right ear   
   
                                        Start: 2020   Patient encounter   
procedure                 Mrac Hammer           -[facility]  
Work Phone:   
[phone]  
   
                                                    Start: 2020  
End: 2020                         Patient encounter   
procedure                               Dwayne Seo MD  
Work Phone:   
[phone]                          [facility]  
Work Phone:   
[phone]  
   
                                        Start: 2020   Patient encounter   
procedure                 Marc Hammer           -[facility]  
Work Phone:   
[phone]  
   
                                        Start: 2020   Patient encounter   
procedure                 Marc Hammer           -[facility]  
Work Phone:   
[phone]  
   
                                                    Start: 2019  
End: 2019                         Patient encounter   
procedure                 Marc Hammer           Facility:[facility]  
   
                                        Start: 2019   Patient encounter   
procedure                 Marc Hammer         Facility:[id number]  
   
                                        Start: 2018   Patient encounter   
procedure                 Marc Hammer           Facility:[facility]  
   
                                                    Start: 2018  
End: 12-                         Patient encounter   
procedure                 Marc Hammer           Facility:[facility]  
   
                                                    Start: 2018  
End: 2018                         Patient encounter   
procedure                 Marc Hammer           Facility:[facility]  
   
                                                    Start: 2018  
End: 2018                         Patient encounter   
procedure                 Marc Hammer           Facility:[facility]  
   
                                                    Start: 2018  
End: 2018                         Patient encounter   
procedure                 Maynorvanessa Pruitt          Facility:[facility]  
   
                                        Start: 2018   Patient encounter   
procedure                 Marc Hammer         Facility:[id number]  
   
                                                    Start: 2018  
End: 2018                         Patient encounter   
procedure                 Marc Camposcel           Facility:[facility]  
   
                                                    Start: 2018  
End: 2018                         Patient encounter   
procedure                 Maynor Pruitt          Facility:[facility]  
   
                                                    Start: 2018  
End: 2018                         Patient encounter   
procedure                 Marc Stencel           Facility:[facility]  
   
                                                    Start: 2018  
End: 2018                         Patient encounter   
procedure                 Marc Camposcel           Facility:[facility]  
   
                                                    Start: 2018  
End: 2018                         Patient encounter   
procedure                 Marc Camposcel           Facility:[facility]  
   
                                                    Start: 2018  
End: 2018                         Patient encounter   
procedure                 Marc Camposcel           Facility:[facility]  
   
                                                    Start: 2018  
End: 2018                         Patient encounter   
procedure                 Marc Hammer           Facility:[facility]  
   
                                                    Start: 2018  
End: 2018                         Patient encounter   
procedure                 Marc Camposcel           Facility:[facility]  
   
                                                    Start: 2018  
End: 2018                         Patient encounter   
procedure                 [facility]      [facility]  
  
  
  
Procedures  
  
  
                          Date         Procedure    Procedure Detail Performing   
Clinician  
   
                                        Start: 2020   Assay of thyroid   
stimulating hormone tsh                             Marc Bethgarret  
   
                                        Start: 2020   CBC W Auto Different  
ial   
panel - Blood                                       Marc Jaquelin  
   
                                        Start: 2020   CELIAC DISEASE SEROL  
OGY   
PANEL                                               Marc Bethgarret  
   
                                        Start: 2020   Comprehensive metabo  
lic   
2000 panel                                          Marc Bethgarret  
   
                                                    Start: 2020  
End: 2020                         Blood pressure within   
normal parameters - no   
follow-up required                                  Dwayne Seo MD  
Work Phone:   
[phone]  
   
                                                    Start: 2020  
End: 2020                         BMI documented as above   
normal parameters -   
follow-up documented                                Dwayne Seo MD  
Work Phone:   
[phone]  
   
                                                    Start: 2020  
End: 2020                         Documentation of current   
medications                                         Dwayne Seo MD  
Work Phone:   
[phone]  
   
                                                    Start: 2020  
End: 2020                         Pain assessment   
documented as positive -   
follow-up documented                                Dwayne Seo MD  
Work Phone:   
[phone]  
   
                                                    Start: 2020  
End: 2020                         Radex hand minimum 3   
views                                               Dwayne Seo MD  
Work Phone:   
[phone]  
   
                                                    Start: 2020  
End: 2020     Tobacco non-user                        Dwayne Seo MD  
Work Phone:   
[phone]  
   
                                       History of Knee Surgery              [place]  
ael Stencel  
   
                                       Hysterectomy              Marc Stencel  
   
                                                    NEGATED: Highlighted   
rowStart: 2020  
End: 2020                         Documentation of current   
medications                                         Sandhya Morales AT  
  
  
  
Plan of Treatment  
  
  
                          Date         Care Activity Detail       Author  
   
                                                    Start:   
2032          Tetanus vaccination Tetanus: Every 10yrs [facility]  
   
                                                    Start:   
2025  
End: 2025                         Patient encounter   
procedure                               2025 10:15 AM EST   
Office Visit [facility] [address]   
[place] [id number] [phone] Merna Lin MD [address] [place] [address]   
[phone] (Work)   
[phone] (Fax)                      [facility]  
   
                                                    Start:   
2024  
End: 2024                         Patient encounter   
procedure                                           [facility]  
   
                                                    Start:   
2023          Fall risk assessment Falls Risk Assessment [facility]  
   
                                                    Start:   
2023                              Pneumococcal Vaccine:   
Age 65+ (1 - PCV)                       Pneumococcal Vaccine:   
Age 65+ (1 - PCV)                       [facility]  
   
                                                    Start:   
2023                              Pneumococcal Vaccine:   
Age 65+ (1 of 1 - PCV)                  Pneumococcal Vaccine:   
Age 65+ (1 of 1 - PCV)                  [facility]  
   
                                                    Start:   
2023                              COVID-19 Vaccine (3 -   
2023- season)                         COVID-19 Vaccine (3 -   
2023-24 season)                         [facility]  
   
                                                    Start:   
2023                Influenza vaccination     Sequential Influenza   
Vaccine (#1)                            [facility]  
   
                                                    Start:   
2020  
End: 2020     Appointment         Appointment         [facility]   
[facility]   
[facility]  
Work Phone:   
[phone]  
   
                                                    Start:   
2008                              Screening for malignant   
neoplasm of colon         Flexible sigmoidoscopy    [facility]  
   
                                                    Start:   
1998                              Screening for malignant   
neoplasm of breast        Mammogram                 [facility]  
   
                                                    Start:   
1976          Hepatitis C screening Hepatitis C Screening [facility]  
   
                                                    Start:   
1973          HIV screening       HIV Screening       [facility]  
   
                                                    Start:   
1970                              Depression screening   
using PHQ-9 (Patient   
Health Questionnaire 9)   
score                                   Depression Screening   
(PHQ-2/9)                               [facility]  
   
                                                    Start:   
1961                              History and physical   
examination, annual for   
health maintenance        Wellness Visit            [facility]  
   
                                                    Start:   
1958                              Screening for malignant   
neoplasm of colon                                   [facility]  
   
                                                    Start:   
1958                              Screening for   
osteoporosis              Dexa Scan                 [facility]  
  
  
  
Payers  
  
  
                          Date         Payer Category Payer        Policy ID  
   
                          2018   Unknown                     
   
                          2018   Unknown                   [id number]  
   
                          1960   Unknown                   [id number] 2.16.84  
[id number]   Unknown                   [id number] 2.16.84  
[id number]   Unknown                   [id number] 2.16.84  
[id number]   Unknown                   [id number] 2.16.84  
[id number]   Unknown                   [id number] 2.16.84  
[id number]   Unknown                   [id number] 2.16.84  
[id number]   Unknown                   [id number] 2.16.84  
[id number]   Unknown                   [id number] 2.16.84  
[id number]   Unknown                   [id number] 2.16.84  
[id number]   Unknown                   [id number] 2.16.84  
[id number]   Unknown                   [id number] 2.16.84  
[id number]   Unknown                   [id number] 2.16.84  
[id number]  
   
                          1960   Unknown                   [id number] 2.16.84  
[id number]  
   
                          1960   Unknown                   [id number] 2.16.84  
[id number]  
   
                          1960   Unknown                   [id number] 2.16.84  
[id number]  
   
                          1960   Unknown                   [id number] 2.16.  
[id number]  
   
                          1960   Unknown                   [id number] 2.16.  
[id number]  
   
                          1960   Unknown                   [id number] 2.16.  
[id number]  
   
                          1960   Unknown                   [id number] 2.16.  
[id number]  
   
                          1960   Unknown                   [id number] 2.16.  
[id number]  
   
                          1960   Unknown                   [id number] 2.16.  
[id number]  
   
                          1960   Unknown                   [id number] 2.16.  
[id number]  
   
                                       Medicare                  [id number]  
[id number]  
   
                                       Medicare                  [id number]  
   
                                       Medicare                  [id number]  
   
                                       Unknown                   [id number]  
  
  
  
Social History  
  
  
                          Date         Type         Detail       Facility  
   
                                                    Start: 2020  
End: 2020           Assertion                 Unknown if ever   
smoked                                  [facility]   
[facility]   
[facility]  
Work Phone:   
[phone]  
   
                                        Start: 2023   Tobacco smoking   
status NHIS               Never smoked tobacco      [facility]  
   
                                        Start: 2023   Tobacco use and   
exposure                                Smokeless tobacco   
non-user                                [facility]  
   
                                                    Start: 2023  
End: 2024           Alcohol intake            Lifetime non-drinker   
(finding)                               [facility]  
   
                                                    Start: 07-  
End: 2023                         History of Social   
function                                            [facility]  
   
                                                    Start: 07-  
End: 2023     Tobacco use panel                       [facility]  
   
                          Start: 1960 Sex Assigned At Birth Not on file  [facility]  
[facility]  
   
                                                    NEGATED: Highlighted   
rowStart: 2020  
End: 2020     Employment detail   Employment detail   [facility]   
[facility]   
[facility]  
Work Phone:   
[phone]  
   
                                                    NEGATED: Highlighted   
row                 -                   -                   [facility]  
s   
[facility]  
Work Phone:   
[phone]  
  
  
  
Functional Status  
  
  
                          Date         Assessment   Result       Facility  
   
                                                    NEGATED: Highlighted   
row                       Functional performance    Functional status   
health issues are not   
documented Disease                      [facility]   
[facility]  
Work Phone:   
[phone]  
  
  
  
Mental Status  
  
  
                          Date         Assessment   Result       Facility  
   
                                                    NEGATED: Highlighted   
row                                     Cognitive function   
[Interpretation]                        Cognitive status   
health issues are not   
documented Disease                      [facility]   
[facility]  
Work Phone:   
[phone]  
  
  
  
History of Present illness Narrative 2024  
                 Gil Scott, Nasim - 2024 11:00 AM EST  
  
                                Note Date & Type Note            Facility  
   
                                                    2024 History of Presen  
t   
illness Narrative                       Formatting of this note is different fro  
m   
the original.  
Images from the original note were not   
included.  
  
  
[facility]  
[facility]  
[address] [id number]  
  
Name: Mitra Zavaleta  
: 1960  
Date: 24  
  
History & Purpose of Evaluation:  
  
Mitra Zavaleta was seen today for   
audiologic assessment at the request of   
Merna Lin MD. She returned for   
follow up on bilateral hearing loss. Her   
hearing was previously assessed on   
2023. At that time it was   
noted,  
 Pure tone audiometry revealed a bilateral   
sensorineural hearing loss, mild in the   
low frequencies through 1500 Hz, sloping   
to severe in the high frequencies .  
Although Ms. Zavaleta did not perceive a   
significant change of hearing since her   
previous evaluation, she did note feeling   
less pressure in her right ear, as well as   
no episodes of dizziness or vertigo since   
her previous visit.  
Please see below for other pertinent case   
history information as reported by Ms. Zavaleta.  
  
Otologic Symptoms R L Noise Exposure Y N   
Medical Y N  
Hearing Loss [x] [x] Occupational [] [x]   
Hypertension [x] []  
Tinnitus [] [] Recreational-now wears   
hearing protection [x] [] Diabetes [] [x]  
Otalgia [] []  [] [x]   
Hypercholesterolemia [] [x]  
Otorrhea [] [] Heart Disease [] [x]  
Aural Fullness- from time to time  [x] []   
Family History-parents [x] [] Stroke [x]   
[]  
Meniere s Disease [x] [] Cancer [] [x]  
Y N Sp./Lang. Skills Ear Surgery R L  
Vertigo [] [x] Appropriate [x] [] PE Tubes   
[] []  
Dizziness [] [x] In Therapy [] [x]   
Mastoidectomy [] []  
Imbalance [] [x] Social Acoustic Neuroma   
[] []  
Vestibular Rehab [] [x] Depression [] [x]   
Tympanoplasty [] []  
Hearing aids: none  
Other:  
  
Results:  
  
Otoscopy:  
  
Performed by Dr. Lin prior to testing.  
  
Puretone Air & Bone Conduction Audiometry:  
  
Pure tone audiometry suggested a bilateral   
sensorineural hearing loss, mild in the   
low frequencies through 1000 Hz, sloping   
to severe in the high frequencies.  
  
Speech Audiometry:  
  
Speech recognition thresholds were in fair   
agreement with puretone averages.  
Word recognition was excellent (94% in the   
right ear and 92% in the left ear) when   
assessed at a level above normal   
conversational loudness using recorded   
male voice.  
  
Immittance Audiometry:  
  
Tympanometry revealed normal ear canal   
volume, normal static compliance, and   
normal resting pressure (Jerger type A),   
in the right ear. Tympanometry in the left   
ear revealed normal ear canal volume, high   
static compliance, and normal resting   
pressure (Jerger type Ad).  
  
Impression:  
  
Middle ear testing was consistent with a   
well-ventilated middle ear system in the   
right ear and with high static compliance   
in the left ear. Puretone audiometry was   
consistent with a severe high frequency   
sensorineural hearing loss, bilaterally,   
with no significant change since previous   
evaluation.  
  
Recommendations:  
  
Follow up with Dr. Lin.  
Further testing and/or re-evaluation at   
Dr. Lin' discretion.  
Hearing aids are recommended, following   
otologic clearance at Dr. Lin'   
discretion.  
Re-evaluate in one year, or sooner if   
concerns arise, to monitor hearing.  
Continued/consistent use of hearing   
protection is recommended when in high   
levels of noise.  
  
The above was explained to Ms Zavaleta and   
she expressed understanding.  
  
Electronically Signed by:  
Nasim Jones, JORDON-A  
24 11:00 AM  
  
Audiogram:  
  
  
Electronically signed by Gil Scott AuD at 2024 2:29 PM EST  
documented in this encounter            [facility]  
  
  
  
History of Present illness Narrative 2024  
    Merna Lin MD - 2024 10:29 AM Jayda Flores MA -   
2024 10:10 AM EST  
  
                                Note Date & Type Note            Facility  
   
                                                    2024 History of Presen  
t   
illness Narrative                       Formatting of this note is different   
from the original.  
[facility] [address] [facility] [place]  
[address] [facility] [id number]  
Dept: [phone]  
Merna Lin MD  
  
Mitra Zavaleta 63 y.o. female  
Patient presents with a chief complaint   
of Follow-up (3 mo follow up )  
  
Temp 97.9 F (36.6 C) (Temporal)   Ht 5'   
3    Wt 96.5 kg (212 lb 12.8 oz)   BMI   
37.70 kg/m  
  
History of Presenting Illness:  
  
The patient/caregiver reports a history   
of complaint with the following   
features:  
  
She reports that her vertigo has   
resolved without any recurrent episodes.   
She has increased her hydration level   
that she feels has helped. She denies   
any changes in her hearing levels, but   
continues to have difficulty with   
hearing. There is no ringing of the   
ears, but she does have occasional ear   
pressure that she associates with   
increased sodium intake.  
  
Review of systems covering 10 systems is   
reviewed and pertinent positives and   
negatives are noted as above.  
  
Past Medical History:  
Diagnosis Date  
Abdominal pain  
Acute ill-defined cerebrovascular   
disease  
Dysuria  
Hypertension  
Lacunar infarction (HCC)  
Respiratory disease  
Trigger thumb  
  
Current Outpatient Medications:  
Ca carb-Ca gluc-Mg ox-Mg gluco (Calcium   
Magnesium) 500 mg calcium -250 mg Tab,   
Take 1 tablet by mouth daily ., Disp: ,   
Rfl:  
lactobacillus combo no.11 (Probiotic) 15   
billion cell CpSP, Take by mouth .,   
Disp: , Rfl:  
losartan (COZAAR) 50 MG tablet, Take 1   
(one) tablet (50 mg total) by mouth   
daily ., Disp: , Rfl:  
multivitamin (THERAGRAN) per tablet,   
Take 1 (one) tablet by mouth daily .,   
Disp: , Rfl:  
VITAMIN B COMPLEX ORAL, Take 1 tablet by   
mouth daily ., Disp: , Rfl:  
Allergies  
Allergen Reactions  
Sulfacetamide Diarrhea  
Tree Nut Hives  
Adhesive Tape-Silicones Dermatitis  
[place] Other (See Comments)  
Numbness to scalp  
Amoxicillin Diarrhea  
  
Past Surgical History:  
Procedure Laterality Date  
CT COLONOSCOPY 2023  
CT COLONOSCOPY  
HYSTERECTOMY  
KNEE SURGERY  
  
Social History  
  
Socioeconomic History  
Marital status:   
Tobacco Use  
Smoking status: Never  
Smokeless tobacco: Never  
Substance and Sexual Activity  
Alcohol use: Never  
Drug use: Never  
  
Family History  
Problem Relation Age of Onset  
Hypertension Mother  
Diabetes Mother  
Kidney failure Mother  
Colon cancer Father  
  
PHYSICAL EXAM:  
The patient was examined today 2024   
with findings as follows:  
  
CONSTITUTIONAL:  
General Appearance: well-appearing,   
nontoxic, alert, no acute distress  
Communication: normal voicing, hearing   
intact to spoken voice  
  
HEAD/FACE:  
Head: atraumatic, normocephalic, no   
lesions  
Facial Inspection: no lesions, healthy   
skin  
Facial Strength: motor strength normal,   
symmetric strength, symmetric movement  
  
EYES:  
Pupils: PERRLA, extra-ocular movements   
intact, no nystagmus, sclera white, no   
redness of eyes, no watering of eyes  
  
EARS:  
Bilateral External Ears: no pits, no   
tags  
Right External Ear: normally formed, no   
lesions, no mastoid tenderness  
Left External Ear: normally formed, no   
lesions, no mastoid tenderness  
Right External Auditory Canal: normal,   
healthy skin, no obstructing cerumen, no   
discharge  
Left External Auditory Canal: normal,   
healthy skin, no obstructing cerumen, no   
discharge  
Right Tympanic Membrane: normal   
landmarks, translucent  
Left Tympanic Membrane: normal   
landmarks, translucent  
Hearing: intact to spoken voice  
  
NECK:  
Neck: no masses, trachea midline, normal   
range of motion, no cysts or pits, no   
tenderness to palpation  
  
LYMPH NODES:  
Cervical: no palpable lymph node   
enlargement  
  
SKIN:  
General Appearance: no lesions, warm and   
dry, normal turgor, no bruising  
  
PSYCHIATRIC:  
Mood and affect: normal mood, normal   
affect  
  
Assessment and Plan:  
  
She is feeling that her symptoms are   
improved with increased hydration and   
sodium restriction. This combined with   
her hearing testing today showing   
resolution of the low frequency hearing   
difference between the right and left   
ear is suggestive of Meni re's disease.   
Audiometric testing is performed today   
and independently reviewed and   
interpreted. This shows a severe sloping   
loss bilateral with good speech   
discrimination. Tympanometry shows   
normal compliance consistent with   
normally ventilated middle ear spaces.   
She is medically cleared for hearing   
aids at this time.  
  
I have advised the patient and/or   
caregiver that the symptoms and exam   
findings are most consistent with   
Meniere's disease. We have discussed   
that the symptoms result from   
inflammation of a fluid filled sac in   
the inner ear. We have discussed that   
although this usually occurs   
spontaneously in isolation, these   
symptoms can also result from other   
disease of the ear or central nervous   
system. Treatment strategies of   
maintenance of a low sodium diet, use of   
diuretic agents, and vestibular   
sedatives are discussed. The natural   
history of the disease process is   
discussed including fluctuation of   
symptoms, the possibilty of sudden   
attacks with fall or other injury, the   
need for taking precautions to prevent   
injury from fall, sudden and permanent   
hearing loss or balance problems, and   
the need for continued follow-up are   
discussed. The patient and/or caregiver   
is able to state an understanding of   
these recommendations and is agreeable   
to the treatment plan.  
  
1. Meniere disease, right  
  
2. Sensorineural hearing loss, bilateral   
Ambulatory referral to Audiology  
  
3. Vertigo  
  
  
Return in about 1 year (around   
2025).  
  
The patient and/or caregiver is to   
notify the office if no improvement or   
worsening of symptoms is noted prior to   
the scheduled follow-up for sooner   
evaluation. The patient and/or caregiver   
is able to state an understanding of   
these recommendations and is agreeable   
to the treatment plan.  
  
--Merna Lin MD on 2024   
at 12:17 PM  
An electronic signature was used to   
authenticate this note.  
Electronically signed by Merna Lin MD at 2024 12:18 PM EST  
Formatting of this note might be   
different from the original.  
Review of Systems  
Constitutional: Negative.  
HENT: Negative.  
Eyes: Negative.  
Respiratory: Negative.  
Cardiovascular: Negative.  
Gastrointestinal: Negative.  
Endocrine: Negative.  
Genitourinary: Negative.  
Musculoskeletal: Negative.  
Skin: Negative.  
Allergic/Immunologic: Positive for food   
allergies.  
Neurological: Negative.  
Hematological: Negative.  
Psychiatric/Behavioral: Negative.  
Electronically signed by Jayda Castro MA at 2024 12:17 PM EST  
documented in this encounter            [facility]  
  
  
  
History of Present illness Narrative 2023  
                 Gil Scott, Nasim - 2023 10:31 AM EST  
  
                                Note Date & Type Note            Facility  
   
                                                    2023 History of Presen  
t   
illness Narrative                       Formatting of this note is different fro  
m   
the original.  
Images from the original note were not   
included.  
  
  
[facility]  
[facility]  
[address] [address]  
  
Name: Mitra Zavaleta  
: 1960  
Date: 23  
  
History & Purpose of Evaluation:  
  
Mitra Zavaleta was seen today for   
audiologic assessment at the request of   
Merna Lin MD. Ms Zavaleta's chief   
auditory complaint was a wax build up, and   
pressure in her right ear. She reported   
the feeling of pressure has been  on and   
off for about a year . Other related   
problems include episodes of   
dizziness/imbalance. She reported those   
episodes occurred in January, July, and   
October, each episode lasting a couple of   
days. She has used saline nasal spray for   
allergy and sinus relief. Prior to her   
episodes of dizziness/ imbalance, she had   
used a saline spray that included   
eucalyptus oil. She suspects the   
Eucalyptus oil was a contributing factor   
to the dizziness/imbalance. Ms. Zavaleta   
reported bilateral hearing loss. The onset   
was gradual, beginning at least six-eight   
years ago.  
  
Please see below for other pertinent case   
history information as reported by Ms. Zavaleta.  
  
Otologic Symptoms R L Noise Exposure Y N   
Medical Y N  
Hearing Loss [x] [x] Occupational [] [x]   
Hypertension [x] []  
Tinnitus- only when stressed  [x] [x]   
Recreational [x] [] Diabetes [] [x]  
Otalgia [x] []  [] []   
Hypercholesterolemia [] [x]  
Otorrhea [] [] Now wears hearing   
protection Heart Disease [] [x]  
Aural Fullness [x] [] Family History [x]   
[] Stroke [x] []  
Meniere s Disease [] [] Cancer [] [x]  
Y N Sp./Lang. Skills Ear Surgery R L  
Vertigo [] [] Appropriate [x] [] PE Tubes   
[] []  
Dizziness [x] [] In Therapy [] [x]   
Mastoidectomy [] []  
Imbalance [x] [] Social Acoustic Neuroma   
[] []  
Vestibular Rehab [] [x] Depression [] [x]   
Tympanoplasty [] []  
Hearing aids: none  
Other:  
  
Results:  
  
Otoscopy:  
  
Performed by Dr. Lin prior to testing.  
  
Puretone Air & Bone Conduction Audiometry:  
  
Pure tone audiometry revealed a bilateral   
sensorineural hearing loss, mild in the   
low frequencies through 1500 Hz, sloping   
to severe in the high frequencies.  
  
Speech Audiometry:  
  
Speech recognition thresholds were in good   
agreement with puretone averages.  
Word recognition was excellent (98% in the   
right ear and 90% in the left ear) when   
assessed at an above-normal conversational   
loudness level using recorded male voice.  
  
Immittance Audiometry:  
  
Tympanometry revealed normal ear canal   
volume, normal static compliance, and   
negative middle ear resting pressure   
(Jerger type C) in the right ear.   
Tympanometry in the left ear revealed   
normal ear canal volume, normal middle ear   
resting pressure, and high static   
compliance (Jerger type Ad).  
  
Impression:  
  
Middle ear testing was consistent with a   
negative pressure in the right ear and   
high compliance in the left ear. Puretone   
and speech audiometry revealed a severe   
high frequency sensorineural hearing loss,   
bilaterally. This hearing loss is expected   
to interfere with communication,   
especially in difficult listening   
situations. Ms. Zavaleta is a candidate for   
amplification with hearing aids and she   
was invited to schedule an appointment for   
a hearing aid consultation. I also   
recommended she contact her insurance   
carrier to verify whether or not she has   
benefits for hearing aids and, if so, if   
she is required to receive services   
through any particular contracted provider   
to receive the benefits.  
  
Recommendations:  
  
Follow up with Dr. Lin.  
Further testing and/or re-evaluation at   
Dr. Lin' discretion.  
Hearing aids are recommended, following   
otologic clearance at Dr. Lin'   
discretion.  
Re-evaluate in one year, or sooner if   
concerns arise, to monitor hearing.  
Use of hearing protection is recommended   
when in high levels of noise.  
  
The above was explained to Ms. Zavaleta and   
she expressed understanding.  
  
Electronically Signed by:  
Nasim Jones, CCC-JAMISON  
23 10:31 AM  
  
Audiogram:  
  
  
Electronically signed by Gil Scott AuD at 2023 3:20 PM EST  
documented in this encounter            [facility]  
  
  
  
History of Present illness Narrative 2023  
    Merna Lin MD - 2023 10:20 AM Sarah Soto MA -   
2023 10:17 AM EST  
  
                                Note Date & Type Note            Facility  
   
                                                    2023 History of Presen  
t   
illness Narrative                       Formatting of this note is different   
from the original.  
[facility] [address] [facility]  
[address] [facility] [id number]  
Dept: [phone]  
MD Mitra Cespedes 63 y.o. female  
Patient presents with a chief complaint   
of Dizziness (Ear pressure, right ear is   
the worst)  
  
Temp 98.2 F (36.8 C)   Ht 5' 3    Wt   
95.3 kg (210 lb)   BMI 37.20 kg/m  
  
History of Presenting Illness:  
  
The patient/caregiver reports a history   
of complaint with the following   
features:  
  
Onset: recurrent cerumen impaction  
Timing: comes and goes  
Duration: dizziness lasts a few days  
Quality: right ear fullness, off balance  
Location: right ear  
Severity: pain none  
Risk factors: seasonal allergies  
Alleviating factors: nothing makes it   
better  
Aggravating factors: eucalyptus oil   
nasal spray triggered most recent   
episode  
Associated factors: several episodes of   
dizziness last last a few days, has   
attributed this to allergies  
  
Review of systems covering 10 systems is   
reviewed and pertinent positives and   
negatives are noted as above.  
  
Past Medical History:  
Diagnosis Date  
Abdominal pain  
Acute ill-defined cerebrovascular   
disease  
Dysuria  
Hypertension  
Lacunar infarction (HCC)  
Respiratory disease  
Trigger thumb  
  
Current Outpatient Medications:  
Ca carb-Ca gluc-Mg ox-Mg gluco (Calcium   
Magnesium) 500 mg calcium -250 mg Tab,   
Take 1 tablet by mouth daily ., Disp: ,   
Rfl:  
lactobacillus combo no.11 (Probiotic) 15   
billion cell CpSP, Take by mouth .,   
Disp: , Rfl:  
losartan (COZAAR) 50 MG tablet, Take 1   
(one) tablet (50 mg total) by mouth   
daily ., Disp: , Rfl:  
multivitamin (THERAGRAN) per tablet,   
Take 1 (one) tablet by mouth daily .,   
Disp: , Rfl:  
VITAMIN B COMPLEX ORAL, Take 1 tablet by   
mouth daily ., Disp: , Rfl:  
Allergies  
Allergen Reactions  
Sulfacetamide Diarrhea  
Tree Nut Hives  
Adhesive Tape-Silicones Dermatitis  
Amoxicillin Diarrhea  
  
Past Surgical History:  
Procedure Laterality Date  
HYSTERECTOMY  
KNEE SURGERY  
  
Social History  
  
Socioeconomic History  
Marital status:   
Tobacco Use  
Smoking status: Never  
Smokeless tobacco: Never  
Substance and Sexual Activity  
Alcohol use: Never  
Drug use: Never  
  
Family History  
Problem Relation Age of Onset  
Hypertension Mother  
Diabetes Mother  
Kidney failure Mother  
Colon cancer Father  
  
PHYSICAL EXAM:  
  
The patient was examined today   
2023 with findings as follows:  
  
CONSTITUTIONAL:  
General Appearance: well-appearing,   
nontoxic, alert, no acute distress  
Communication: understanding at normal   
conversational tones, normal voicing,   
speech intelligible  
  
HEAD/FACE:  
Head: atraumatic, normocephalic, no   
lesions  
Facial Inspection: no lesions, healthy   
skin  
Facial Strength: motor strength normal,   
symmetric strength, symmetric movement  
Sinuses: no sinus tenderness  
Salivary Glands: no enlargements of   
parotid glands, no tenderness of parotid   
glands, no masses of parotid glands,   
clear salivary flow on palpation from   
Stensen's ducts, no duct stones of   
Stensen's duct, no enlargement of   
submandibular glands, no tenderness of   
submandibular glands, no masses of   
submandibular glands, clear salivary   
flow from Ha's ducts, no stones of   
Yolo's ducts  
Temporomandibular Joint: no crepitus   
with motion, no tenderness on palpation,   
no trismus, motion symmetric  
  
EYES:  
Pupils: PERRLA, extra-ocular movements   
intact, no nystagmus, sclera white, no   
redness of eyes, no watering of eyes  
  
EARS:  
Bilateral External Ears: no pits, no   
tags  
Right External Ear: normally formed, no   
lesions, no mastoid tenderness  
Left External Ear: normally formed, no   
lesions, no mastoid tenderness  
Right External Auditory Canal: normal,   
healthy skin, no obstructing cerumen, no   
discharge  
Left External Auditory Canal: normal,   
healthy skin, no obstructing cerumen, no   
discharge  
Right Tympanic Membrane: normal   
landmarks, translucent, mobile to   
pneumatic otoscopy, no perforation  
Left Tympanic Membrane: normal   
landmarks, translucent, mobile to   
pneumatic otoscopy, no perforation  
Hearing: intact to spoken voice, intact   
to finger rub  
  
NOSE:  
Nasal Skin: no lesions, no lacerations,   
no scars  
Nasal Dorsum: symmetric with no visible   
or palpable deformities  
Nasal Tip: normal symmetric nasal tip,   
normal nasal valves  
Nasal Mucosa: normal, pink and moist  
Septum: not markedly deformed, midline,   
no exposed vessels, no bleeding, no   
septal granuloma  
Turbinates: normal size and conformation  
Nasopharynx: normal  
  
ORAL CAVITY/MOUTH:  
Lips, teeth, gums: normal lips, normal   
gums, dentition intact, no dental pain   
on palpation  
Oral Mucosa: normal, moist, no lesions  
Palate: normal hard palate, normal soft   
palate, symmetric palatal elevation  
Floor of Mouth: normal floor of mouth  
Tongue: normal tongue, no lesions, no   
edema, no masses, normal mucosa, mobile  
Tonsils: normal tonsils, symmetric, no   
lesions  
Posterior pharynx: normal  
  
NECK:  
Neck: no masses, trachea midline, normal   
range of motion, no cysts or pits, no   
tenderness to palpation  
Thyroid: normal thyroid, no enlargement,   
no tenderness, no nodules  
  
LYMPH NODES:  
Cervical: no palpable lymph node   
enlargement  
  
RESPIRATORY:  
Inspection/Auscultation: good air   
movement, chest expands symmetrically,   
normal breath sounds, no wheezing, no   
stridor  
  
CARDIOVASCULAR SYSTEM:  
Auscultation: regular rate and rhythm,   
carotid pulse normal, no carotid   
thrills, no carotid bruits   
Observation/Palpation of Peripheral   
Vascular System: no varicosities, no   
cyanosis, no edema  
  
SKIN:  
General Appearance: no lesions, warm and   
dry, normal turgor, no bruising  
  
NEUROLOGICAL SYSTEM:  
Orientation: oriented to time, oriented   
to place, oriented to person  
Cranial Nerves: Cranial Nerves II-XII   
intact, normal facial movement  
  
PSYCHIATRIC:  
Mood and affect: normal mood, normal   
affect  
  
Assessment and Plan:  
  
She presents with intermittent right ear   
pressure and dizziness. We have   
discussed that Meniere's disease may be   
considered in this setting. I see no   
middle ear disease or obstructing   
cerumen on exam. Audiometric testing is   
performed today and independently   
reviewed and interpreted. This shows a   
sloping loss of severe degree that is   
slightly worse in the low frequencies on   
the right, but with normal speech   
discrimination. Tympanometry shows   
normal compliance consistent with   
normally ventilated middle ear spaces.   
She may benefit from hearing aids in   
this setting but I would advise that we   
wait to establish that the hearing is   
stable.  
  
I have advised the patient and/or   
caregiver that the symptoms and exam   
findings are possibly consistent with   
Meniere's disease. We have discussed   
that the symptoms result from   
inflammation of a fluid filled sac in   
the inner ear. We have discussed that   
although this usually occurs   
spontaneously in isolation, these   
symptoms can also result from other   
disease of the ear or central nervous   
system. Treatment strategies of   
maintenance of a low sodium diet, use of   
diuretic agents, and vestibular   
sedatives are discussed. The natural   
history of the disease process is   
discussed including fluctuation of   
symptoms, the possibilty of sudden   
attacks with fall or other injury, the   
need for taking precautions to prevent   
injury from fall, sudden and permanent   
hearing loss or balance problems, and   
the need for continued follow-up are   
discussed. The patient and/or caregiver   
is able to state an understanding of   
these recommendations and is agreeable   
to the treatment plan.  
  
1. Vertigo  
  
2. Sensorineural hearing loss, bilateral  
  
3. Abnormal auditory perception of right   
ear Ambulatory referral to Audiology  
  
  
Return in about 3 months (around   
2024).  
  
The patient and/or caregiver is to   
notify the office if no improvement or   
worsening of symptoms is noted prior to   
the scheduled follow-up for sooner   
evaluation. The patient and/or caregiver   
is able to state an understanding of   
these recommendations and is agreeable   
to the treatment plan.  
  
--Merna Lin MD on 2023   
at 11:25 AM  
An electronic signature was used to   
authenticate this note.  
Electronically signed by Merna Lin MD at 2023 11:29 AM EST  
Formatting of this note might be   
different from the original.  
Review of Systems  
Constitutional: Negative.  
HENT: Positive for ear pain, hearing   
loss and tinnitus.  
Eyes: Negative.  
Respiratory: Negative.  
Cardiovascular: Negative.  
Gastrointestinal: Negative.  
Endocrine: Negative.  
Genitourinary: Negative.  
Musculoskeletal: Negative.  
Skin: Negative.  
Allergic/Immunologic: Positive for   
environmental allergies and food   
allergies.  
Neurological: Positive for dizziness and   
light-headedness.  
Hematological: Negative.  
Psychiatric/Behavioral: Negative.  
  
  
Electronically signed by Sarah Andrea MA at 2023 11:29 AM EST  
documented in this encounter            [facility]  
  
  
  
Evaluation note  
  
  
                                Note Date & Type Note            Facility  
  
  
  
                                                    Evaluation note   
  
  
  
                                                    Diagnosis  
   
                                                      
  
  
Vertigo- Primary  
  
  
Dizziness and giddiness  
   
                                                      
  
  
Sensorineural hearing loss, bilateral  
   
                                                      
  
  
Abnormal auditory perception of right ear  
  
documented in this encounter  
[facility]  
  
Evaluation note  
  
  
                                Note Date & Type Note            Facility  
  
  
  
                                                    Evaluation note   
  
  
  
                                                    Diagnosis  
   
                                                      
  
  
Sensorineural hearing loss, bilateral- Primary  
  
documented in this encounter  
[facility]  
  
Evaluation note  
  
  
                                Note Date & Type Note            Facility  
  
  
  
                                                    Evaluation note   
  
  
  
                                                    Diagnosis  
   
                                                      
  
  
Sensorineural hearing loss, bilateral  
  
documented in this encounter  
[facility]  
  
Evaluation note  
  
  
                                Note Date & Type Note            Facility  
  
  
  
                                                    Evaluation note   
  
  
  
                                                    Diagnosis  
   
                                                      
  
  
Meniere disease, right- Primary  
   
                                                      
  
  
Sensorineural hearing loss, bilateral  
   
                                                      
  
  
Vertigo  
  
  
Dizziness and giddiness  
  
documented in this encounter  
[facility]  
  
Summary Purpose  
  
  
                                                      
  
  
  
Family History  
No Family History Records Found      Mother  
  
                                Name            Dates           Details  
   
                                                    Family history of diabetes m  
ellitus(V18.0, Z83.3)  
                                                    Status:Active  
   
                                                    Family history of hypertensi  
on(V17.49, Z82.49)  
                                                    Status:Active  
   
                                                    Family history of renal fail  
ure(V18.69, Z84.1)  
                                                    Status:Active  
  
                                     Father  
  
                                Name            Dates           Details  
   
                                                    Family history of malignant   
neoplasm of colon(V16.0, Z80.0)  
                                                    Status:Active  
  
  
  
Advance Directives  
No Advanced Directives Records FoundNo Advanced Directives Records FoundNo 
Advanced Directives Records FoundThere may be information available, but it has 
not been provided by the sender.No Advanced Directives Records FoundNo Advanced 
Directives Records FoundNo Advanced Directives Records Found  
  
Chief Complaint  
  
  
                                        Chief Complaint Description Start Date  
   
                                        bilateral hand        
   
                                                            Preliminary chief co  
mplaint data, not yet signed by the   
author as of   
  
  
  
Instructions  
  
  
                                        Instruction Description Start Date  
   
                                                    CompletedPatient advised to   
follow-up with Primary Care Physician for BMI   
management.                               
  
  
  
                                Name            Dates           Details  
   
                                                    Instructions not documented  
                                                      
  
  
  
Assessments  
There may be information available, but it has not been provided by the sender.  
  
Review of System  
There may be information available, but it has not been provided by the sender.  
  
History of Present Illness  
There may be information available, but it has not been provided by the sender.  
  
Reason for Referral  
  
  
                          Specialty    Diagnoses / Procedures Referred By Contac  
t Referred To Contact  
   
                                        Audiology             
  
  
Diagnoses  
  
  
Abnormal auditory   
perception of right ear  
                                          
  
  
Merna Lin MD  
  
  
[address] [place] [address]  
  
  
Phone: [phone]  
  
  
Fax: [phone]                         
  
  
[facility] [address]  
  
  
[place] [address]  
  
  
Phone: [phone]  
  
  
Fax: [phone]  
  
  
  
                    Referral ID Status    Reason    Start Date Expiration Date V  
isits   
Requested                               Visits   
Authorized  
   
                [id number] Authorized         2023 1       1  
  
  
  
                          Specialty    Diagnoses / Procedures Referred By Contac  
t Referred To Contact  
   
                                        Audiology             
  
  
Diagnoses  
  
  
Sensorineural hearing   
loss, bilateral  
                                          
  
  
Merna Lin MD  
  
  
[address] [place] [address]  
  
  
Phone: [phone]  
  
  
Fax: [phone]                         
  
  
[facility]  
  
  
[address] [place] [address]  
  
  
Phone: [phone]  
  
  
Fax: [phone]  
  
  
  
                Referral ID Status  Reason  Start Date Expiration Date Visits Re  
quested Visits   
Authorized  
   
                [id number] Closed          2024 1       1  
  
  
  
Additional Source Comments  
  
  
  
                                                    INFORMATION SOURCE (unrecogn  
ized section and content)  
   
                                          
  
  
  
                                        DATE CREATED        AUTHOR  
   
                                2019                      Merged with [facility]  
  
  
  
                                DATE CREATED    AUTHOR          AUTHOR'S ORGANIZ  
ATION  
   
                                2019                      [facility] [place] [facility]  
  
  
  
                                DATE CREATED    AUTHOR          AUTHOR'S ORGANIZ  
ATION  
   
                                10/02/2019                      [facility]  
  
  
  
                                DATE CREATED    AUTHOR          AUTHOR'S ORGANIZ  
ATION  
   
                                2021                       [employer]  
  
  
  
                                DATE CREATED    AUTHOR          AUTHOR'S ORGANIZ  
ATION  
   
                                2021                      Merged with [facility]  
  
  
  
                                DATE CREATED    AUTHOR          AUTHOR'S ORGANIZ  
ATION  
   
                                2024                      [facility]  
  
  
  
  
  
                                                    Reason for Visit (unrecogniz  
ed section and content)  
   
                                          
  
  
  
                                                    Reason For Visit Description  
   
                                        New/Est - 1st visit with physician   
   
                                                            Preliminary reason f  
or visit data, not   
yet signed by the author as of   
  
   
                                        bilateral hand        
  
  
  
                                        Reason              Comments  
   
                                        Dizziness           Ear pressure, right   
ear is the worst  
  
  
  
                          Specialty    Diagnoses / Procedures Referred By Contac  
t Referred To Contact  
   
                                        Audiology             
  
  
Diagnoses  
  
  
Sensorineural hearing   
loss, bilateral  
                                          
  
  
Merna Lin MD  
  
  
[address] [place] [address]  
  
  
Phone: [phone]  
  
  
Fax: [phone]                         
  
  
[facility]  
  
  
[address] [place] [address]  
  
  
Phone: [phone]  
  
  
Fax: [phone]  
  
  
  
                Referral ID Status  Reason  Start Date Expiration Date Visits Re  
quested Visits   
Authorized  
   
                [id number] Closed          2024 1       1  
  
  
  
                                        Reason              Comments  
   
                                        Follow-up           3 mo follow up  
  
  
  
  
  
                                                    Care Teams (unrecognized sec  
tion and content)  
   
                                          
  
  
  
                      Team Member Relationship Specialty  Start Date End Date  
   
                                                      
  
  
Marc Hammer MD  
  
  
NPI: [id number]  
  
  
[address] [place] [address]  
  
  
[phone] (Work)  
  
  
[phone] (Fax) PCP - General   Family Medicine 3/8/18            
  
  
  
                      Team Member Relationship Specialty  Start Date End Date  
   
                                                      
  
  
Marc Hammer MD  
  
  
NPI: [id number]  
  
  
[address] [place], [place] [address]  
  
  
[phone] (Work)  
  
  
[phone] (Fax) PCP - General   Family Medicine 3/8/18            
  
  
  
                      Team Member Relationship Specialty  Start Date End Date  
   
                                                      
  
  
Marc Hammer MD  
  
  
NPI: [id number]  
  
  
[address] [place], [place] [address]  
  
  
[phone] (Work)  
  
  
[phone] (Fax) PCP - General   Family Medicine 3/8/18            
  
  
  
                      Team Member Relationship Specialty  Start Date End Date  
   
                                                      
  
  
Marc Hammer MD  
  
  
NPI: [id number]  
  
  
[address] [place], [place] [address]  
  
  
[phone] (Work)  
  
  
[phone] (Fax) PCP - General   Family Medicine 3/8/18            
  
  
FOR RECORDS PERTAINING TO PATIENTS WHO ARE OR HAVE BEEN ENROLLED IN A CHEMICAL 
DEPENDENCY/SUBSTANCEABUSE PROGRAM, SOME INFORMATION MAY BE OMITTED. This 
clinical summary was aggregated from multiple sources. Caution should be 
exercised in using it in the provision of clinical care. This summary normalizes
 information from multiple sources, and as a consequence, information in this 
document may materially change the coding, format and clinical context of 
patient data. In addition, data may be omitted in some cases. CLINICAL DECISIONS
 SHOULD BE BASED ON THE PRIMARY CLINICAL RECORDS. [employer] provides
 no warranty or guarantee of the accuracy or completeness of information in this
 document.

## 2024-08-21 ENCOUNTER — HOSPITAL ENCOUNTER (OUTPATIENT)
Age: 64
Discharge: HOME | End: 2024-08-21
Payer: MEDICARE

## 2024-08-21 DIAGNOSIS — Z13.6: Primary | ICD-10-CM

## 2024-08-21 PROCEDURE — 76706 US ABDL AORTA SCREEN AAA: CPT

## 2024-08-21 NOTE — AAAS_ITS
Reason For Study: Screening 
 
Aorta Measurements                                Aorta Doppler Measurements 
Proximal aorta measures2.02 x 2.04cm. in cross-   Peak systolic flow velocities within the proximal 
sectional axis.                                   aorta measure 74.1 cm/sec. 
Proximal aorta measures2.01cm. in longitudinal    Peak systolic flow velocities within the mid aorta 
axis.                                             measure 88.6 cm/sec. 
Mid aorta measures1.63 x 1.63cm. in cross-        Peak systolic flow velocities within the distal 
sectional axis.                                   aorta measure 104.9 cm/sec. 
Mid aorta measures1.68cm. in longitudinal axis. 
Distal aorta measures1.62 x 1.62cm. in cross- 
sectional axis. 
Distal aorta measures1.62cm. in longitudinal axis. 
 
Left Iliac Artery 
Left iliac artery measures 1.14 x 1.11 cm. in the cross-sectional axis. Left iliac artery measures 
1.10 cm. in the longitudinal axis. Peak systolic velocity in the left iliac artery measures 75.9 
cm/sec. 
 
Right Iliac Artery 
Right iliac artery measures 1.18 x 1.12 cm. in the cross-sectional axis. Right iliac artery measures 
1.15 cm. in the longitudinal axis. Peak systolic velocity in the right iliac artery measures 81.4 
cm/sec. 
 
Procedure 
Aorta IVC Iliac vasculature or bypass grafts 65149. Exam performed in department. 
 
ORDER #: [id number] VL/AAA Screening  
Interpretation Summary  
The dimensions of the intra-abdominal aorta are normal, without evidence of ane
urysmal dilatation.  
The iliac arteries are also normal in caliber bilaterally. The intra-abdominal 
aorta and iliac  
arteries are patent, demonstrating normal, pulsatile arterial flow and normal p
eak systolic  
velocities.  
 
  
 
  
______________________________________________________________________________  
Ordering Physician: Alon Watkins  
Referring Physician: Alon Watkins  
Performed By: Monica Hodgson RVT  
Electronically signed by: Dawson Martinez MD on 08/21/2024 10:13 PM

## 2024-09-06 ENCOUNTER — HOSPITAL ENCOUNTER (OUTPATIENT)
Dept: RADIOLOGY | Facility: CLINIC | Age: 64
Discharge: HOME | End: 2024-09-06
Payer: MEDICARE

## 2024-09-06 VITALS — HEIGHT: 64 IN | BODY MASS INDEX: 37.79 KG/M2 | WEIGHT: 221.36 LBS

## 2024-09-06 DIAGNOSIS — Z12.31 ENCOUNTER FOR SCREENING MAMMOGRAM FOR MALIGNANT NEOPLASM OF BREAST: ICD-10-CM

## 2024-09-06 DIAGNOSIS — M19.91 PRIMARY OSTEOARTHRITIS, UNSPECIFIED SITE: ICD-10-CM

## 2024-09-06 PROCEDURE — 77063 BREAST TOMOSYNTHESIS BI: CPT | Performed by: RADIOLOGY

## 2024-09-06 PROCEDURE — 77067 SCR MAMMO BI INCL CAD: CPT | Performed by: RADIOLOGY

## 2024-09-06 PROCEDURE — 77080 DXA BONE DENSITY AXIAL: CPT | Mod: GA

## 2024-09-06 PROCEDURE — 77067 SCR MAMMO BI INCL CAD: CPT

## 2024-09-06 ASSESSMENT — LIFESTYLE VARIABLES
CURRENT_SMOKER: N
3_OR_MORE_DRINKS_PER_DAY: N

## 2025-06-09 ENCOUNTER — APPOINTMENT (OUTPATIENT)
Dept: SURGERY | Facility: CLINIC | Age: 65
End: 2025-06-09
Payer: MEDICARE

## 2025-06-09 ENCOUNTER — DOCUMENTATION (OUTPATIENT)
Dept: SURGERY | Facility: CLINIC | Age: 65
End: 2025-06-09

## 2025-06-09 VITALS
DIASTOLIC BLOOD PRESSURE: 76 MMHG | WEIGHT: 220.6 LBS | SYSTOLIC BLOOD PRESSURE: 130 MMHG | BODY MASS INDEX: 37.66 KG/M2 | HEART RATE: 65 BPM | HEIGHT: 64 IN

## 2025-06-09 DIAGNOSIS — Z12.11 COLON CANCER SCREENING: ICD-10-CM

## 2025-06-09 DIAGNOSIS — Z80.0 FAMILY HISTORY OF COLON CANCER REQUIRING SCREENING COLONOSCOPY: Primary | ICD-10-CM

## 2025-06-09 DIAGNOSIS — Z15.09 LYNCH SYNDROME: ICD-10-CM

## 2025-06-09 PROCEDURE — 3008F BODY MASS INDEX DOCD: CPT | Performed by: SURGERY

## 2025-06-09 PROCEDURE — 3078F DIAST BP <80 MM HG: CPT | Performed by: SURGERY

## 2025-06-09 PROCEDURE — 1159F MED LIST DOCD IN RCRD: CPT | Performed by: SURGERY

## 2025-06-09 PROCEDURE — 1036F TOBACCO NON-USER: CPT | Performed by: SURGERY

## 2025-06-09 PROCEDURE — 3075F SYST BP GE 130 - 139MM HG: CPT | Performed by: SURGERY

## 2025-06-09 PROCEDURE — 99204 OFFICE O/P NEW MOD 45 MIN: CPT | Performed by: SURGERY

## 2025-06-09 RX ORDER — FEXOFENADINE HCL AND PSEUDOEPHEDRINE HCL 180; 240 MG/1; MG/1
1 TABLET, EXTENDED RELEASE ORAL DAILY
COMMUNITY

## 2025-06-09 RX ORDER — MULTIVITAMIN
1 TABLET ORAL DAILY
COMMUNITY

## 2025-06-09 RX ORDER — ONDANSETRON HYDROCHLORIDE 2 MG/ML
4 INJECTION, SOLUTION INTRAVENOUS ONCE AS NEEDED
OUTPATIENT
Start: 2025-06-09

## 2025-06-09 RX ORDER — LOSARTAN POTASSIUM 50 MG/1
1 TABLET ORAL
COMMUNITY
Start: 2025-05-10

## 2025-06-09 RX ORDER — SODIUM CHLORIDE, SODIUM LACTATE, POTASSIUM CHLORIDE, CALCIUM CHLORIDE 600; 310; 30; 20 MG/100ML; MG/100ML; MG/100ML; MG/100ML
50 INJECTION, SOLUTION INTRAVENOUS CONTINUOUS
OUTPATIENT
Start: 2025-06-09 | End: 2025-06-10

## 2025-06-09 NOTE — PROGRESS NOTES
General Surgery Consultation    Patient: Tita Muse  : 1960  MRN: [id number]  Date of Consultation: 25    Primary Care Provider: Christopher Behr Ranney, MD    Chief Complaint: Surveillance colonoscopy    History of Present Illness: Tita Muse is a 64 y.o. old female seen to schedule surveillance colonoscopy due to a family history of colon cancer.  I did her colonoscopy 10 years ago and she got sick after having some cereal with milk.  5 years ago Dr. Kirk did her colonoscopy and he kept her light and she could feel a lot of it.  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  She is having no change in her bowels.  She states she typically has to days of more constipated stools and then a regular bowel movement and it alternates like that.  She denies seeing any blood.    Medical History:  Medical History[1]    Surgical History:  Surgical History[2]    Home Medications:  Prior to Admission medications    Medication Sig Start Date End Date Taking? Authorizing Provider   fexofenadine-pseudoephedrine (Allegra-D 24) 180-240 mg 24 hr tablet Take 1 tablet by mouth once daily. Do not crush, chew, or split.   Yes Historical Provider, MD   losartan (Cozaar) 50 mg tablet Take 1 tablet (50 mg) by mouth early in the morning.. 5/10/25  Yes Historical Provider, MD   multivitamin tablet Take 1 tablet by mouth once daily.   Yes Historical Provider, MD       Allergies:  Allergies[3]  is allergic to ampicillin and benadryl [diphenhydramine hcl].    Family History:   Family History[4]    Social History:  Social History[5]    ROS:  Constitutional:  no fever, sweats, and chills  Cardiovascular: No chest pain  Respiratory: No cough or shortness of breath  Gastrointestinal: Denies blood  Genitourinary: no dysuria  Musculoskeletal: no weakness or swelling  Integumentary: no  "rashes  Neurological: no confusion  Endocrine: no heat or cold intolerance  Heme/Lymph: no easy bruising or bleeding    Objective:  /76   Pulse 65   Ht 1.626 m (5' 4.02\")   Wt 100 kg (220 lb 9.6 oz)   BMI 37.84 kg/m²     Physical Exam:  Constitutional: No acute distress, conversant, pleasant  Neurologic: alert and oriented  Psych: appropriate affect  Ears, Nose, Mouth and Throat: mucus membranes moist  Pulmonary: No labored breathing  Cardiovascular: Regular rate and rhythm  Abdomen: soft, non-distended, non-tender  Musculoskeletal: Moves all extremities, no edema  Skin: warm and dry    Assessment and Plan: Tita Muse is a 64 y.o. old female with Family history of colon cancer for surveillance colonoscopy.  We discussed both supporting her head so there is not pressure on her ear and she should remind the nurses when she gets there.  I have encouraged her to avoid food she knows make her sick.  We can keep her for a little bit longer and give her some Zofran.  She knows if she feels nauseous we can give her some Zofran at home.  She states she tries to avoid medicines.  We discussed the indication for colonoscopy as well as the differential diagnosis including anything from no findings all the way up to a colon cancer.  We reviewed the prep as well as the risks and potential complications including but not limited to bleeding, infection, reaction to the anesthetic, stroke MI and/or death.  We discussed the risk of perforation or need for completion barium studies.  All questions were answered and she asked us to proceed.     Serenity Trinidad MD  6/9/2025         [1]   Past Medical History:  Diagnosis Date    Hypertension     Meniere's disease of right ear    [2]   Past Surgical History:  Procedure Laterality Date    COLONOSCOPY  03/09/2020    REPEAT 5 YRS/ DR CARRIZALES    HYSTERECTOMY  06/04/2018    Hysterectomy    KNEE SURGERY Left 06/04/2018    Knee Surgery   [3]   Allergies  Allergen Reactions    " Ampicillin Diarrhea    Benadryl [Diphenhydramine Hcl] Rash   [4]   Family History  Problem Relation Name Age of Onset    Diabetes Mother      Kidney disease Mother      Heart failure Mother      Colon cancer Father  83            Lung cancer Maternal Grandfather      Prostate cancer Paternal Grandfather     [5]   Social History  Socioeconomic History    Marital status:    Tobacco Use    Smoking status: Never    Smokeless tobacco: Never   Vaping Use    Vaping status: Never Used   Substance and Sexual Activity    Alcohol use: Never    Drug use: Never    Sexual activity: Defer

## 2025-06-09 NOTE — PROGRESS NOTES
Notified patient of  colonoscopy  scheduled on 6-23-25 .Instructions reviewed. Advised patient P.A.T will contact them 24 hours before surgery with arrival time. Pt voices understanding. Latonia Handy MA.

## 2025-06-09 NOTE — H&P (VIEW-ONLY)
General Surgery Consultation    Patient: Tita Muse  : 1960  MRN: [id number]  Date of Consultation: 25    Primary Care Provider: Christopher Behr Ranney, MD    Chief Complaint: Surveillance colonoscopy    History of Present Illness: Tita Muse is a 64 y.o. old female seen to schedule surveillance colonoscopy due to a family history of colon cancer.  I did her colonoscopy 10 years ago and she got sick after having some cereal with milk.  5 years ago Dr. Kirk did her colonoscopy and he kept her light and she could feel a lot of it.  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  She is having no change in her bowels.  She states she typically has to days of more constipated stools and then a regular bowel movement and it alternates like that.  She denies seeing any blood.    Medical History:  Medical History[1]    Surgical History:  Surgical History[2]    Home Medications:  Prior to Admission medications    Medication Sig Start Date End Date Taking? Authorizing Provider   fexofenadine-pseudoephedrine (Allegra-D 24) 180-240 mg 24 hr tablet Take 1 tablet by mouth once daily. Do not crush, chew, or split.   Yes Historical Provider, MD   losartan (Cozaar) 50 mg tablet Take 1 tablet (50 mg) by mouth early in the morning.. 5/10/25  Yes Historical Provider, MD   multivitamin tablet Take 1 tablet by mouth once daily.   Yes Historical Provider, MD       Allergies:  Allergies[3]  is allergic to ampicillin and benadryl [diphenhydramine hcl].    Family History:   Family History[4]    Social History:  Social History[5]    ROS:  Constitutional:  no fever, sweats, and chills  Cardiovascular: No chest pain  Respiratory: No cough or shortness of breath  Gastrointestinal: Denies blood  Genitourinary: no dysuria  Musculoskeletal: no weakness or swelling  Integumentary: no  "rashes  Neurological: no confusion  Endocrine: no heat or cold intolerance  Heme/Lymph: no easy bruising or bleeding    Objective:  /76   Pulse 65   Ht 1.626 m (5' 4.02\")   Wt 100 kg (220 lb 9.6 oz)   BMI 37.84 kg/m²     Physical Exam:  Constitutional: No acute distress, conversant, pleasant  Neurologic: alert and oriented  Psych: appropriate affect  Ears, Nose, Mouth and Throat: mucus membranes moist  Pulmonary: No labored breathing  Cardiovascular: Regular rate and rhythm  Abdomen: soft, non-distended, non-tender  Musculoskeletal: Moves all extremities, no edema  Skin: warm and dry    Assessment and Plan: Tita Muse is a 64 y.o. old female with Family history of colon cancer for surveillance colonoscopy.  We discussed both supporting her head so there is not pressure on her ear and she should remind the nurses when she gets there.  I have encouraged her to avoid food she knows make her sick.  We can keep her for a little bit longer and give her some Zofran.  She knows if she feels nauseous we can give her some Zofran at home.  She states she tries to avoid medicines.  We discussed the indication for colonoscopy as well as the differential diagnosis including anything from no findings all the way up to a colon cancer.  We reviewed the prep as well as the risks and potential complications including but not limited to bleeding, infection, reaction to the anesthetic, stroke MI and/or death.  We discussed the risk of perforation or need for completion barium studies.  All questions were answered and she asked us to proceed.     Serenity Trinidad MD  6/9/2025         [1]   Past Medical History:  Diagnosis Date    Hypertension     Meniere's disease of right ear    [2]   Past Surgical History:  Procedure Laterality Date    COLONOSCOPY  03/09/2020    REPEAT 5 YRS/ DR CARRIZALES    HYSTERECTOMY  06/04/2018    Hysterectomy    KNEE SURGERY Left 06/04/2018    Knee Surgery   [3]   Allergies  Allergen Reactions    " Ampicillin Diarrhea    Benadryl [Diphenhydramine Hcl] Rash   [4]   Family History  Problem Relation Name Age of Onset    Diabetes Mother      Kidney disease Mother      Heart failure Mother      Colon cancer Father  83            Lung cancer Maternal Grandfather      Prostate cancer Paternal Grandfather     [5]   Social History  Socioeconomic History    Marital status:    Tobacco Use    Smoking status: Never    Smokeless tobacco: Never   Vaping Use    Vaping status: Never Used   Substance and Sexual Activity    Alcohol use: Never    Drug use: Never    Sexual activity: Defer

## 2025-06-23 ENCOUNTER — HOSPITAL ENCOUNTER (OUTPATIENT)
Dept: OPERATING ROOM | Facility: HOSPITAL | Age: 65
Setting detail: OUTPATIENT SURGERY
Discharge: HOME | End: 2025-06-23
Payer: MEDICARE

## 2025-06-23 ENCOUNTER — HOSPITAL ENCOUNTER (OUTPATIENT)
Dept: RADIOLOGY | Facility: HOSPITAL | Age: 65
Discharge: HOME | End: 2025-06-23
Payer: MEDICARE

## 2025-06-23 VITALS
TEMPERATURE: 97.9 F | HEIGHT: 64 IN | HEART RATE: 66 BPM | DIASTOLIC BLOOD PRESSURE: 70 MMHG | OXYGEN SATURATION: 96 % | WEIGHT: 213.41 LBS | BODY MASS INDEX: 36.43 KG/M2 | SYSTOLIC BLOOD PRESSURE: 135 MMHG | RESPIRATION RATE: 16 BRPM

## 2025-06-23 DIAGNOSIS — Z80.0 FAMILY HISTORY OF COLON CANCER REQUIRING SCREENING COLONOSCOPY: ICD-10-CM

## 2025-06-23 DIAGNOSIS — Z12.11 COLON CANCER SCREENING: ICD-10-CM

## 2025-06-23 DIAGNOSIS — Z12.11 SCREENING FOR COLON CANCER: ICD-10-CM

## 2025-06-23 DIAGNOSIS — Z12.11 SCREENING FOR COLON CANCER: Primary | ICD-10-CM

## 2025-06-23 PROCEDURE — G0121 COLON CA SCRN NOT HI RSK IND: HCPCS | Performed by: SURGERY

## 2025-06-23 PROCEDURE — 74270 X-RAY XM COLON 1CNTRST STD: CPT

## 2025-06-23 PROCEDURE — G0500 MOD SEDAT ENDO SERVICE >5YRS: HCPCS | Performed by: SURGERY

## 2025-06-23 PROCEDURE — 99153 MOD SED SAME PHYS/QHP EA: CPT

## 2025-06-23 PROCEDURE — 7100000009 HC PHASE TWO TIME - INITIAL BASE CHARGE

## 2025-06-23 PROCEDURE — G0105 COLORECTAL SCRN; HI RISK IND: HCPCS

## 2025-06-23 PROCEDURE — G0500 MOD SEDAT ENDO SERVICE >5YRS: HCPCS

## 2025-06-23 PROCEDURE — 3700000012 HC SEDATION LEVEL 5+ TIME - INITIAL 15 MINUTES 5/> YEARS

## 2025-06-23 PROCEDURE — 3600000002 HC OR TIME - INITIAL BASE CHARGE - PROCEDURE LEVEL TWO

## 2025-06-23 PROCEDURE — 2550000001 HC RX 255 CONTRASTS: Performed by: SURGERY

## 2025-06-23 PROCEDURE — 7100000010 HC PHASE TWO TIME - EACH INCREMENTAL 1 MINUTE

## 2025-06-23 PROCEDURE — 3600000007 HC OR TIME - EACH INCREMENTAL 1 MINUTE - PROCEDURE LEVEL TWO

## 2025-06-23 PROCEDURE — 2500000004 HC RX 250 GENERAL PHARMACY W/ HCPCS (ALT 636 FOR OP/ED): Performed by: SURGERY

## 2025-06-23 PROCEDURE — 74270 X-RAY XM COLON 1CNTRST STD: CPT | Performed by: RADIOLOGY

## 2025-06-23 RX ORDER — MIDAZOLAM HYDROCHLORIDE 5 MG/ML
INJECTION, SOLUTION INTRAMUSCULAR; INTRAVENOUS AS NEEDED
Status: COMPLETED | OUTPATIENT
Start: 2025-06-23 | End: 2025-06-23

## 2025-06-23 RX ORDER — DIATRIZOATE MEGLUMINE AND DIATRIZOATE SODIUM 660; 100 MG/ML; MG/ML
60 SOLUTION ORAL; RECTAL ONCE
Status: COMPLETED | OUTPATIENT
Start: 2025-06-23 | End: 2025-06-23

## 2025-06-23 RX ORDER — FENTANYL CITRATE 50 UG/ML
INJECTION, SOLUTION INTRAMUSCULAR; INTRAVENOUS AS NEEDED
Status: COMPLETED | OUTPATIENT
Start: 2025-06-23 | End: 2025-06-23

## 2025-06-23 RX ORDER — ONDANSETRON HYDROCHLORIDE 2 MG/ML
4 INJECTION, SOLUTION INTRAVENOUS ONCE AS NEEDED
Status: DISCONTINUED | OUTPATIENT
Start: 2025-06-23 | End: 2025-06-24 | Stop reason: HOSPADM

## 2025-06-23 RX ORDER — SODIUM CHLORIDE, SODIUM LACTATE, POTASSIUM CHLORIDE, CALCIUM CHLORIDE 600; 310; 30; 20 MG/100ML; MG/100ML; MG/100ML; MG/100ML
50 INJECTION, SOLUTION INTRAVENOUS CONTINUOUS
Status: DISCONTINUED | OUTPATIENT
Start: 2025-06-23 | End: 2025-06-24 | Stop reason: HOSPADM

## 2025-06-23 RX ADMIN — FENTANYL CITRATE 25 MCG: 50 INJECTION, SOLUTION INTRAMUSCULAR; INTRAVENOUS at 11:50

## 2025-06-23 RX ADMIN — MIDAZOLAM HYDROCHLORIDE 2 MG: 5 INJECTION, SOLUTION INTRAMUSCULAR; INTRAVENOUS at 11:49

## 2025-06-23 RX ADMIN — DIATRIZOATE MEGLUMINE AND DIATRIZOATE SODIUM 600 ML: 660; 100 LIQUID ORAL; RECTAL at 14:30

## 2025-06-23 RX ADMIN — DIATRIZOATE MEGLUMINE AND DIATRIZOATE SODIUM 60 ML: 660; 100 LIQUID ORAL; RECTAL at 14:31

## 2025-06-23 RX ADMIN — FENTANYL CITRATE 25 MCG: 50 INJECTION, SOLUTION INTRAMUSCULAR; INTRAVENOUS at 11:45

## 2025-06-23 RX ADMIN — MIDAZOLAM HYDROCHLORIDE 3 MG: 5 INJECTION, SOLUTION INTRAMUSCULAR; INTRAVENOUS at 11:45

## 2025-06-23 RX ADMIN — MIDAZOLAM HYDROCHLORIDE 3 MG: 5 INJECTION, SOLUTION INTRAMUSCULAR; INTRAVENOUS at 11:43

## 2025-06-23 RX ADMIN — GLUCAGON 1 MG: KIT at 11:43

## 2025-06-23 RX ADMIN — MIDAZOLAM HYDROCHLORIDE 2 MG: 5 INJECTION, SOLUTION INTRAMUSCULAR; INTRAVENOUS at 11:51

## 2025-06-23 RX ADMIN — SODIUM CHLORIDE, SODIUM LACTATE, POTASSIUM CHLORIDE, AND CALCIUM CHLORIDE 50 ML/HR: .6; .31; .03; .02 INJECTION, SOLUTION INTRAVENOUS at 11:00

## 2025-06-23 RX ADMIN — FENTANYL CITRATE 50 MCG: 50 INJECTION, SOLUTION INTRAMUSCULAR; INTRAVENOUS at 11:43

## 2025-06-23 ASSESSMENT — PAIN SCALES - GENERAL
PAINLEVEL_OUTOF10: 3
PAINLEVEL_OUTOF10: 0 - NO PAIN
PAINLEVEL_OUTOF10: 0 - NO PAIN
PAINLEVEL_OUTOF10: 3
PAINLEVEL_OUTOF10: 0 - NO PAIN

## 2025-06-23 ASSESSMENT — COLUMBIA-SUICIDE SEVERITY RATING SCALE - C-SSRS
1. IN THE PAST MONTH, HAVE YOU WISHED YOU WERE DEAD OR WISHED YOU COULD GO TO SLEEP AND NOT WAKE UP?: NO
2. HAVE YOU ACTUALLY HAD ANY THOUGHTS OF KILLING YOURSELF?: NO
6. HAVE YOU EVER DONE ANYTHING, STARTED TO DO ANYTHING, OR PREPARED TO DO ANYTHING TO END YOUR LIFE?: NO

## 2025-06-23 ASSESSMENT — PAIN - FUNCTIONAL ASSESSMENT
PAIN_FUNCTIONAL_ASSESSMENT: 0-10

## 2025-06-23 NOTE — DISCHARGE INSTRUCTIONS
Patient Instructions after a Colonoscopy      The anesthetics, sedatives or narcotics which were given to you today will be acting in your body for the next 24 hours, so you might feel a little sleepy or groggy.  This feeling should slowly wear off. Carefully read and follow the instructions.     You received sedation today:  - Do not drive or operate any machinery or power tools of any kind.   - No alcoholic beverages today, not even beer or wine.  - Do not make any important decisions or sign any legal documents.  - No over the counter medications that contain alcohol or that may cause drowsiness.  - Do not make any important decisions or sign any legal documents.  - Make sure you have someone with you for first 24 hours.    While it is common to experience mild to moderate abdominal distention, gas, or belching after your procedure, if any of these symptoms occur following discharge from the GI Lab or within one week of having your procedure, call the [facility] to be advised whether a visit to your nearest Urgent Care or Emergency Department is indicated.  Take this paper with you if you go.     - If you develop an allergic reaction to the medications that were given during your procedure such as difficulty breathing, rash, hives, severe nausea, vomiting or lightheadedness.  - If you experience chest pain, shortness of breath, severe abdominal pain, fevers and chills.  -If you develop signs and symptoms of bleeding such as blood in your spit, if your stools turn black, tarry, or bloody  - If you have not urinated within 8 hours following your procedure.  - If your IV site becomes painful, red, inflamed, or looks infected.    If you received a biopsy/polypectomy/sphincterotomy the following instructions apply below:    __ Do not use Aspirin containing products, non-steroidal medications or anti-coagulants for one week following your procedure. (Examples of these types of medications are: Advil,  Arthrotec, Aleve, Coumadin, Ecotrin, Heparin, Ibuprofen, Indocin, Motrin, Naprosyn, Nuprin, Plavix, Vioxx, and Voltarin, or their generic forms.  This list is not all-inclusive.  Check with your physician or pharmacist before resuming medications.)   __ Eat a soft diet today.  Avoid foods that are poorly digested for the next 24 hours.  These foods would include: nuts, beans, lettuce, red meats, and fried foods. Start with liquids and advance your diet as tolerated, gradually work up to eating solids.   __ Do not have a Barium Study or Enema for one week.    Your physician recommends the additional following instructions:    -You have a contact number available for emergencies. The signs and symptoms of potential delayed complications were discussed with you. You may return to normal activities tomorrow.  -Resume your previous diet.  -Continue your present medications.   -We are waiting for your pathology results.  -Your physician has recommended a repeat colonoscopy (date to be determined after pending pathology results are reviewed) for surveillance based on pathology results.  -The findings and recommendations have been discussed with you.  -The findings and recommendations were discussed with your family.  - Please see Medication Reconciliation Form for new medication/medications prescribed.       If you experience any problems or have any questions following discharge from the GI Lab, please call:        Nurse Signature                                                                        Date___________________                                                                            Patient/Responsible Party Signature                                        Date___________________